# Patient Record
Sex: FEMALE | Race: WHITE | NOT HISPANIC OR LATINO | Employment: UNEMPLOYED | URBAN - METROPOLITAN AREA
[De-identification: names, ages, dates, MRNs, and addresses within clinical notes are randomized per-mention and may not be internally consistent; named-entity substitution may affect disease eponyms.]

---

## 2017-11-13 ENCOUNTER — HOSPITAL ENCOUNTER (EMERGENCY)
Facility: HOSPITAL | Age: 57
Discharge: HOME | End: 2017-11-13
Attending: EMERGENCY MEDICINE | Admitting: EMERGENCY MEDICINE
Payer: COMMERCIAL

## 2017-11-13 VITALS
DIASTOLIC BLOOD PRESSURE: 84 MMHG | BODY MASS INDEX: 35.34 KG/M2 | SYSTOLIC BLOOD PRESSURE: 129 MMHG | OXYGEN SATURATION: 98 % | RESPIRATION RATE: 20 BRPM | HEIGHT: 64 IN | WEIGHT: 207 LBS | TEMPERATURE: 98.2 F | HEART RATE: 86 BPM

## 2017-11-13 DIAGNOSIS — J30.9 ALLERGIC RHINITIS: Primary | ICD-10-CM

## 2017-11-13 PROCEDURE — 99282 EMERGENCY DEPT VISIT SF MDM: CPT

## 2017-11-13 RX ORDER — PREDNISONE 20 MG/1
60 TABLET ORAL DAILY
Qty: 15 TABLET | Refills: 0 | Status: SHIPPED | OUTPATIENT
Start: 2017-11-13 | End: 2017-11-18

## 2017-11-13 RX ORDER — LORATADINE 10 MG/1
10 TABLET ORAL DAILY
Qty: 20 TABLET | Refills: 0 | Status: SHIPPED | OUTPATIENT
Start: 2017-11-13

## 2017-11-13 RX ORDER — MOMETASONE FUROATE 50 UG/1
2 SPRAY, METERED NASAL DAILY
Qty: 17 G | Refills: 0 | Status: SHIPPED | OUTPATIENT
Start: 2017-11-13 | End: 2018-07-10

## 2017-11-13 RX ORDER — PREDNISONE 20 MG/1
60 TABLET ORAL ONCE
Status: COMPLETED | OUTPATIENT
Start: 2017-11-13 | End: 2017-11-13

## 2017-11-13 RX ADMIN — PREDNISONE 60 MG: 20 TABLET ORAL at 17:12

## 2017-11-13 NOTE — ED PROVIDER NOTES
History  Chief Complaint   Patient presents with    Nasal Congestion     61-year-old female patient presents to the emergency department initially stating something about blurry vision however she states this has been going on for over one and half years  The reason she came to the emergency department today was because of a burning sensation she is having in her ears nose mouth and throat  The patient states this is worse over last several days  Patient does have some nasal congestion, does have a remote history of environmental allergies, does state that her symptoms which he has had before were taking care of by Percocet  Currently, the patient is no distress, speaking in full and interrupted sentences without pausing to breathe, as no adventitious breath sounds  The patient is congested is her only real symptom  Because the patient has no other new symptoms other than the nasal congestion, does have swollen turbinates, does have nasal congestion, does have seasonal allergies under past, The patient will be treated for seasonal allergies  History provided by:  Patient   used: No    Eye Problem   Quality:  Aching  Severity:  Mild  Onset quality:  Gradual  Timing:  Constant  Progression:  Worsening  Chronicity:  Recurrent  Context: not chemical exposure, not using machinery and not UV exposure    Relieved by:  Nothing  Worsened by:  Nothing  Ineffective treatments:  None tried  Associated symptoms: no crusting, no discharge, no double vision, no itching, no nausea, no photophobia, no scotomas, no swelling and no tingling        Prior to Admission Medications   Prescriptions Last Dose Informant Patient Reported? Taking?    AMLODIPINE BESYLATE PO   Yes Yes   Sig: Take by mouth daily   escitalopram (LEXAPRO) 10 mg tablet   Yes Yes   Sig: Take 10 mg by mouth daily   metFORMIN (GLUCOPHAGE) 500 mg tablet   Yes No   Sig: Take 500 mg by mouth 2 (two) times a day with meals Facility-Administered Medications: None       Past Medical History:   Diagnosis Date    Cancer ([facility] )     Depression     Diabetes mellitus ([facility] )     Hypertension        Past Surgical History:   Procedure Laterality Date    HYSTERECTOMY      KIDNEY STONE SURGERY      SKIN GRAFT         History reviewed  No pertinent family history  I have reviewed and agree with the history as documented  Social History   Substance Use Topics    Smoking status: Current Every Day Smoker     Packs/day: 1 00    Smokeless tobacco: Never Used    Alcohol use No        Review of Systems   Eyes: Negative for double vision, photophobia, discharge and itching  Gastrointestinal: Negative for nausea  Neurological: Negative for tingling  All other systems reviewed and are negative  Physical Exam  ED Triage Vitals [11/13/17 1551]   Temperature Pulse Respirations Blood Pressure SpO2   98 2 °F (36 8 °C) 86 20 129/84 98 %      Temp Source Heart Rate Source Patient Position - Orthostatic VS BP Location FiO2 (%)   Oral Monitor Sitting Left arm --      Pain Score       8           Orthostatic Vital Signs  Vitals:    11/13/17 1551   BP: 129/84   Pulse: 86   Patient Position - Orthostatic VS: Sitting       Physical Exam   Constitutional: She is oriented to person, place, and time  She appears well-developed and well-nourished  HENT:   Head: Normocephalic and atraumatic  Right Ear: External ear normal    Left Ear: External ear normal    Eyes: Conjunctivae and EOM are normal    Neck: No JVD present  No tracheal deviation present  No thyromegaly present  Cardiovascular: Normal rate  Pulmonary/Chest: Effort normal and breath sounds normal  No stridor  Abdominal: Soft  She exhibits no distension and no mass  There is no tenderness  There is no guarding  No hernia  Musculoskeletal: Normal range of motion  She exhibits no edema, tenderness or deformity  Lymphadenopathy:     She has no cervical adenopathy     Neurological: She is alert and oriented to person, place, and time  Skin: Skin is warm  No rash noted  No erythema  No pallor  Psychiatric: She has a normal mood and affect  Her behavior is normal    Nursing note and vitals reviewed  ED Medications  Medications   predniSONE tablet 60 mg (60 mg Oral Given 11/13/17 1712)       Diagnostic Studies  Results Reviewed     None                 No orders to display              Procedures  Procedures       Phone Contacts  ED Phone Contact    ED Course  ED Course                                MDM  Number of Diagnoses or Management Options  Allergic rhinitis: new and requires workup     Amount and/or Complexity of Data Reviewed  Clinical lab tests: ordered and reviewed  Tests in the radiology section of CPT®: ordered and reviewed  Decide to obtain previous medical records or to obtain history from someone other than the patient: yes  Review and summarize past medical records: yes    Patient Progress  Patient progress: stable    CritCare Time    Disposition  Final diagnoses: Allergic rhinitis     Time reflects when diagnosis was documented in both MDM as applicable and the Disposition within this note     Time User Action Codes Description Comment    11/13/2017  5:03 PM Mackenzie Carrington [J30 9] Allergic rhinitis       ED Disposition     ED Disposition Condition Comment    Discharge  [facility] discharge to home/self care      Condition at discharge: Good        Follow-up Information     Follow up With Specialties Details Why [facility]  Follow up as directed on 11/16 [address] [facility][note 3] [phone]          Discharge Medication List as of 11/13/2017  5:05 PM      START taking these medications    Details   loratadine (CLARITIN) 10 mg tablet Take 1 tablet by mouth daily, Starting Mon 11/13/2017, Print      mometasone (NASONEX) 50 mcg/act nasal spray 2 sprays into each nostril daily, Starting Mon 11/13/2017, Print predniSONE 20 mg tablet Take 3 tablets by mouth daily for 5 days, Starting Mon 11/13/2017, Until Sat 11/18/2017, Print         CONTINUE these medications which have NOT CHANGED    Details   AMLODIPINE BESYLATE PO Take by mouth daily, Historical Med      escitalopram (LEXAPRO) 10 mg tablet Take 10 mg by mouth daily, Until Discontinued, Historical Med      metFORMIN (GLUCOPHAGE) 500 mg tablet Take 500 mg by mouth 2 (two) times a day with meals, Until Discontinued, Historical Med           No discharge procedures on file      ED Provider  Electronically Signed by           Alen Pfeiffer, DO  11/17/17 50 Annamaria Mccloud, DO  11/17/17 5473

## 2017-11-13 NOTE — DISCHARGE INSTRUCTIONS
Allergies, Ambulatory Care   GENERAL INFORMATION:   Allergies  are an immune system reaction to a substance called an allergen  Your immune system sees the allergen as harmful and attacks it  Common symptoms include the following:   · Sneezing and runny, itchy, or stuffy nose    · Swollen, watery, or itchy eyes    · Itchy skin, mouth, ears, or throat    · Swelling, pain, or itch at the site of an insect sting  Seek immediate care for the following symptoms:   · Trouble swallowing or your throat or tongue is swollen    · Wheezing or trouble breathing    · Dizziness or feeling faint    · Chest pain or your heart is fluttering  Treatment for allergies  may include medicines to slow a serious allergic reaction  You may be given medicines that help decrease itching, sneezing, and swelling or help your nose feel less stuffy  Your healthcare provider may give you several different medicines to help decrease swelling, redness, and itching  Medicines may be given as pills, shots, or put directly on your skin  Nasal sprays or eye drops may also be used  Desensitization treatment may get your body used to allergens you cannot avoid  Your healthcare provider will give you a shot that contains a small amount of an allergen, giving a little more each time until your body gets used to it  Your healthcare provider will watch you closely and treat any allergic reaction you have  Your reaction to the allergen may be less serious after this treatment  Ask your healthcare provider how long you need to get the shots  Manage allergies:   · Use nasal rinses  Healthcare providers may suggest that you rinse your nasal passages with a saline solution  Daily rinsing may help clear your nose of allergens  · Do not smoke  Your allergy symptoms may decrease if you are not around smoke  If you smoke, it is never too late to quit  Ask your healthcare provider for information about how to stop if you need help quitting      · Carry medical alert identification  You may want to wear medical alert jewelry or carry a card that says you have an allergy  Ask your healthcare provider where to get medical alert identification  Prevent allergic reactions:   · Avoid seasonal allergic reactions  Do not go outside when pollen counts are high  Your symptoms may be better if you go outside only in the morning or evening  Use your air conditioner and change air filters often  · Dust and vacuum your home often  to avoid allergic reactions to dust, fur, or mold  You may want to wear a mask when you vacuum  Keep pets in certain rooms and bathe them often  Use a dehumidifier (machine that decreases moisture) to help prevent mold  · Do not use products that contain latex  if you have a latex allergy  Use nonlatex gloves if you work in healthcare or in food preparation  Always tell healthcare providers if you have a latex allergy  · Avoid insect stings  Stay away from areas or activities that increase your risk for being stung  These include trash cans, gardening, and picnics  Do not wear bright clothing or strong scents when you will be outside  Follow up with your healthcare provider as directed:  Write down your questions so you remember to ask them during your visits  CARE AGREEMENT:   You have the right to help plan your care  Learn about your health condition and how it may be treated  Discuss treatment options with your caregivers to decide what care you want to receive  You always have the right to refuse treatment  The above information is an  only  It is not intended as medical advice for individual conditions or treatments  Talk to your doctor, nurse or pharmacist before following any medical regimen to see if it is safe and effective for you  © 2014 [address] is for End User's use only and may not be sold, redistributed or otherwise used for commercial purposes   All illustrations and images included in Tad are the copyrighted property of A D A M , Inc  or Peter Whittington

## 2018-02-17 ENCOUNTER — APPOINTMENT (EMERGENCY)
Dept: CT IMAGING | Facility: HOSPITAL | Age: 58
End: 2018-02-17
Payer: COMMERCIAL

## 2018-02-17 ENCOUNTER — HOSPITAL ENCOUNTER (EMERGENCY)
Facility: HOSPITAL | Age: 58
Discharge: HOME | End: 2018-02-18
Attending: EMERGENCY MEDICINE
Payer: COMMERCIAL

## 2018-02-17 DIAGNOSIS — R42 INTERMITTENT LIGHTHEADEDNESS: ICD-10-CM

## 2018-02-17 DIAGNOSIS — F41.9 ANXIETY: ICD-10-CM

## 2018-02-17 DIAGNOSIS — R00.2 PALPITATIONS: Primary | ICD-10-CM

## 2018-02-17 LAB
ALBUMIN SERPL BCP-MCNC: 3.7 G/DL (ref 3.5–5)
ALP SERPL-CCNC: 103 U/L (ref 46–116)
ALT SERPL W P-5'-P-CCNC: 45 U/L (ref 12–78)
ANION GAP SERPL CALCULATED.3IONS-SCNC: 9 MMOL/L (ref 4–13)
APTT PPP: 32 SECONDS (ref 23–35)
AST SERPL W P-5'-P-CCNC: 23 U/L (ref 5–45)
BASOPHILS # BLD AUTO: 0.05 THOUSANDS/ΜL (ref 0–0.1)
BASOPHILS NFR BLD AUTO: 1 % (ref 0–1)
BILIRUB SERPL-MCNC: 0.4 MG/DL (ref 0.2–1)
BUN SERPL-MCNC: 16 MG/DL (ref 5–25)
CALCIUM SERPL-MCNC: 9.3 MG/DL (ref 8.3–10.1)
CHLORIDE SERPL-SCNC: 102 MMOL/L (ref 100–108)
CO2 SERPL-SCNC: 27 MMOL/L (ref 21–32)
CREAT SERPL-MCNC: 0.85 MG/DL (ref 0.6–1.3)
EOSINOPHIL # BLD AUTO: 0.21 THOUSAND/ΜL (ref 0–0.61)
EOSINOPHIL NFR BLD AUTO: 2 % (ref 0–6)
ERYTHROCYTE [DISTWIDTH] IN BLOOD BY AUTOMATED COUNT: 12.8 % (ref 11.6–15.1)
GFR SERPL CREATININE-BSD FRML MDRD: 77 ML/MIN/1.73SQ M
GLUCOSE SERPL-MCNC: 177 MG/DL (ref 65–140)
HCT VFR BLD AUTO: 43.5 % (ref 34.8–46.1)
HGB BLD-MCNC: 14.9 G/DL (ref 11.5–15.4)
INR PPP: 1.05 (ref 0.86–1.16)
LYMPHOCYTES # BLD AUTO: 3.05 THOUSANDS/ΜL (ref 0.6–4.47)
LYMPHOCYTES NFR BLD AUTO: 31 % (ref 14–44)
MAGNESIUM SERPL-MCNC: 2 MG/DL (ref 1.6–2.6)
MCH RBC QN AUTO: 28.7 PG (ref 26.8–34.3)
MCHC RBC AUTO-ENTMCNC: 34.3 G/DL (ref 31.4–37.4)
MCV RBC AUTO: 84 FL (ref 82–98)
MONOCYTES # BLD AUTO: 0.97 THOUSAND/ΜL (ref 0.17–1.22)
MONOCYTES NFR BLD AUTO: 10 % (ref 4–12)
NEUTROPHILS # BLD AUTO: 5.57 THOUSANDS/ΜL (ref 1.85–7.62)
NEUTS SEG NFR BLD AUTO: 56 % (ref 43–75)
NRBC BLD AUTO-RTO: 0 /100 WBCS
PLATELET # BLD AUTO: 210 THOUSANDS/UL (ref 149–390)
PMV BLD AUTO: 12 FL (ref 8.9–12.7)
POTASSIUM SERPL-SCNC: 3.7 MMOL/L (ref 3.5–5.3)
PROT SERPL-MCNC: 7.5 G/DL (ref 6.4–8.2)
PROTHROMBIN TIME: 13.9 SECONDS (ref 12.1–14.4)
RBC # BLD AUTO: 5.2 MILLION/UL (ref 3.81–5.12)
SODIUM SERPL-SCNC: 138 MMOL/L (ref 136–145)
TROPONIN I SERPL-MCNC: <0.02 NG/ML
TSH SERPL DL<=0.05 MIU/L-ACNC: 2.24 UIU/ML (ref 0.36–3.74)
WBC # BLD AUTO: 9.88 THOUSAND/UL (ref 4.31–10.16)

## 2018-02-17 PROCEDURE — 84443 ASSAY THYROID STIM HORMONE: CPT | Performed by: EMERGENCY MEDICINE

## 2018-02-17 PROCEDURE — 71275 CT ANGIOGRAPHY CHEST: CPT

## 2018-02-17 PROCEDURE — 85730 THROMBOPLASTIN TIME PARTIAL: CPT | Performed by: EMERGENCY MEDICINE

## 2018-02-17 PROCEDURE — 85025 COMPLETE CBC W/AUTO DIFF WBC: CPT | Performed by: EMERGENCY MEDICINE

## 2018-02-17 PROCEDURE — 84484 ASSAY OF TROPONIN QUANT: CPT | Performed by: EMERGENCY MEDICINE

## 2018-02-17 PROCEDURE — 93005 ELECTROCARDIOGRAM TRACING: CPT

## 2018-02-17 PROCEDURE — 80053 COMPREHEN METABOLIC PANEL: CPT | Performed by: EMERGENCY MEDICINE

## 2018-02-17 PROCEDURE — 85610 PROTHROMBIN TIME: CPT | Performed by: EMERGENCY MEDICINE

## 2018-02-17 PROCEDURE — 36415 COLL VENOUS BLD VENIPUNCTURE: CPT | Performed by: EMERGENCY MEDICINE

## 2018-02-17 PROCEDURE — 96360 HYDRATION IV INFUSION INIT: CPT

## 2018-02-17 PROCEDURE — 83735 ASSAY OF MAGNESIUM: CPT | Performed by: EMERGENCY MEDICINE

## 2018-02-17 RX ORDER — LORAZEPAM 0.5 MG/1
0.5 TABLET ORAL ONCE
Status: COMPLETED | OUTPATIENT
Start: 2018-02-17 | End: 2018-02-17

## 2018-02-17 RX ADMIN — LORAZEPAM 0.5 MG: 0.5 TABLET ORAL at 21:57

## 2018-02-17 RX ADMIN — SODIUM CHLORIDE 1000 ML: 0.9 INJECTION, SOLUTION INTRAVENOUS at 22:00

## 2018-02-17 RX ADMIN — IOHEXOL 85 ML: 350 INJECTION, SOLUTION INTRAVENOUS at 22:34

## 2018-02-18 VITALS
BODY MASS INDEX: 34.85 KG/M2 | RESPIRATION RATE: 16 BRPM | HEART RATE: 84 BPM | WEIGHT: 203.04 LBS | TEMPERATURE: 97.7 F | SYSTOLIC BLOOD PRESSURE: 139 MMHG | DIASTOLIC BLOOD PRESSURE: 63 MMHG | OXYGEN SATURATION: 97 %

## 2018-02-18 LAB
ATRIAL RATE: 80 BPM
ATRIAL RATE: 84 BPM
P AXIS: 66 DEGREES
P AXIS: 68 DEGREES
PR INTERVAL: 150 MS
PR INTERVAL: 154 MS
QRS AXIS: 25 DEGREES
QRS AXIS: 26 DEGREES
QRSD INTERVAL: 84 MS
QRSD INTERVAL: 88 MS
QT INTERVAL: 368 MS
QT INTERVAL: 408 MS
QTC INTERVAL: 434 MS
QTC INTERVAL: 470 MS
T WAVE AXIS: 141 DEGREES
T WAVE AXIS: 152 DEGREES
TROPONIN I SERPL-MCNC: <0.02 NG/ML
VENTRICULAR RATE: 80 BPM
VENTRICULAR RATE: 84 BPM

## 2018-02-18 PROCEDURE — 84484 ASSAY OF TROPONIN QUANT: CPT | Performed by: EMERGENCY MEDICINE

## 2018-02-18 PROCEDURE — 93010 ELECTROCARDIOGRAM REPORT: CPT | Performed by: INTERNAL MEDICINE

## 2018-02-18 PROCEDURE — 36415 COLL VENOUS BLD VENIPUNCTURE: CPT | Performed by: EMERGENCY MEDICINE

## 2018-02-18 PROCEDURE — 93005 ELECTROCARDIOGRAM TRACING: CPT

## 2018-02-18 PROCEDURE — 99285 EMERGENCY DEPT VISIT HI MDM: CPT

## 2018-02-18 RX ORDER — HYDROXYZINE HYDROCHLORIDE 25 MG/1
25 TABLET, FILM COATED ORAL EVERY 6 HOURS PRN
Qty: 12 TABLET | Refills: 0 | Status: SHIPPED | OUTPATIENT
Start: 2018-02-18 | End: 2018-07-10

## 2018-02-18 NOTE — DISCHARGE INSTRUCTIONS
Heart Palpitations   WHAT YOU NEED TO KNOW:   Heart palpitations are feelings that your heart races, jumps, throbs, or flutters  You may feel extra beats, no beats for a short time, or skipped beats  You may have these feelings in your chest, throat, or neck  They may happen when you are sitting, standing, or lying  Heart palpitations may be frightening, but are usually not caused by a serious problem  DISCHARGE INSTRUCTIONS:   Call 911 or have someone else call for any of the following:   · You have any of the following signs of a heart attack:      ¨ Squeezing, pressure, or pain in your chest that lasts longer than 5 minutes or returns    ¨ Discomfort or pain in your back, neck, jaw, stomach, or arm     ¨ Trouble breathing    ¨ Nausea or vomiting    ¨ Lightheadedness or a sudden cold sweat, especially with chest pain or trouble breathing    · You have any of the following signs of a stroke:      ¨ Numbness or drooping on one side of your face     ¨ Weakness in an arm or leg    ¨ Confusion or difficulty speaking    ¨ Dizziness, a severe headache, or vision loss    · You faint or lose consciousness  Seek care immediately if:   · Your palpitations happen more often or last longer than usual      · You have palpitations and shortness of breath, nausea, sweating, or dizziness  Contact your healthcare provider if:   · You have questions or concerns about your condition or care  Follow up with your healthcare provider as directed: You may need to follow up with a cardiologist  Naveed Crain may need tests to check for heart problems that cause palpitations  Write down your questions so you remember to ask them during your visits  Keep a record:  Write down when your palpitations start and stop, what you were doing when they started, and your symptoms  Keep track of what you ate or drank within a few hours of your palpitations   Include anything that seemed to help your symptoms, such as lying down or holding your breath  This record will help you and your healthcare provider learn what triggers your palpitations  Bring this record with you to your follow up visits  Help prevent heart palpitations:   · Manage stress and anxiety  Find ways to relax such as listening to music, meditating, or doing yoga  Exercise can also help decrease stress and anxiety  Talk to someone you trust about your stress or anxiety  You can also talk to a therapist      · Get plenty of sleep every night  Ask your healthcare provider how much sleep you need each night  · Do not drink caffeine or alcohol  Caffeine and alcohol can make your palpitations worse  Caffeine is found in soda, coffee, tea, chocolate, and drinks that increase your energy  · Do not smoke  Nicotine and other chemicals in cigarettes and cigars may damage your heart and blood vessels  Ask your healthcare provider for information if you currently smoke and need help to quit  E-cigarettes or smokeless tobacco still contain nicotine  Talk to your healthcare provider before you use these products  · Do not use illegal drugs  Talk to your healthcare provider if you use illegal drugs and want help to quit  © 2017 [address] [facility] Information is for End User's use only and may not be sold, redistributed or otherwise used for commercial purposes  All illustrations and images included in CareNotes® are the copyrighted property of A D A M , Inc  or Peter Whittington  The above information is an  only  It is not intended as medical advice for individual conditions or treatments  Talk to your doctor, nurse or pharmacist before following any medical regimen to see if it is safe and effective for you  Lightheadedness   WHAT YOU NEED TO KNOW:   Lightheadedness is the feeling that you may faint, but you do not  Your heartbeat may be fast or feel like it flutters   Lightheadedness may occur when you take certain medicines, such as medicine to lower your blood pressure  Dehydration, low sodium, low blood sugar, an abnormal heart rhythm, and anxiety are other common causes  DISCHARGE INSTRUCTIONS:   Return to the emergency department if:   · You have sudden chest pain  · You have trouble breathing or shortness of breath  · You have vision changes, are sweating, and have nausea while you are sitting or lying down  · You feel flushed and your heart is fluttering  · You faint  Contact your healthcare provider if:   · You feel lightheaded often  · Your heart beats faster or slower than usual      · You have questions or concerns about your condition or care  Follow up with your healthcare provider as directed: You may need more tests to help find the cause of your lightheadedness  The tests will help healthcare providers plan the best treatment for you  Write down your questions so you remember to ask them during your visits  Self-care:  Talk with your healthcare provider about these and other ways to manage your symptoms:  · Lie down  when you feel lightheaded, your throat gets tight, or your vision changes  Raise your legs above the level of your heart  · Stand up slowly  Sit on the side of the bed or couch for a few minutes before you stand up  · Take slow, deep breaths when you feel lightheaded  This can help decrease the feeling that you might faint  · Ask if you need to avoid hot baths and saunas  These may make your symptoms worse  Watch for signs of low blood sugar: These include hunger, nervousness, sweating, and fast or fluttery heartbeats  Talk with your healthcare provider about ways to keep your blood sugar level steady  Check your blood pressure often:  You should do this especially if you take medicine to lower your blood pressure  Check your blood pressure when you are lying down and when you are standing  Ask how often to check during the day  Keep a record of your blood pressure numbers   Your healthcare provider may use the record to help plan your treatment  Keep a record of your lightheadedness episodes:  Include your symptoms and your activity before and after the episode  The record can help your healthcare provider find the cause of your lightheadedness and help you manage episodes  © 2017 [address] Information is for End User's use only and may not be sold, redistributed or otherwise used for commercial purposes  All illustrations and images included in CareNotes® are the copyrighted property of A D A M , Inc  or Peter Whittington  The above information is an  only  It is not intended as medical advice for individual conditions or treatments  Talk to your doctor, nurse or pharmacist before following any medical regimen to see if it is safe and effective for you  Anxiety   WHAT YOU SHOULD KNOW:   Anxiety is a condition that causes you to feel excessive worry, uneasiness, or fear  Family or work stress, smoking, caffeine, and alcohol can increase your risk for anxiety  Certain medicines or health conditions can also increase your risk  Anxiety may begin gradually, and can become a long-term condition if it is not managed or treated  AFTER YOU LEAVE:   Medicines:   · Medicines  can help you feel more calm and relaxed, and decrease your symptoms  · Take your medicine as directed  Contact your healthcare provider if you think your medicine is not helping or if you have side effects  Tell him if you are allergic to any medicine  Keep a list of the medicines, vitamins, and herbs you take  Include the amounts, and when and why you take them  Bring the list or the pill bottles to follow-up visits  Carry your medicine list with you in case of an emergency  Follow up with your healthcare provider within 2 weeks or as directed:  Write down your questions so you remember to ask them during your visits  Manage anxiety:   · Go to counseling as directed  Cognitive behavioral therapy can help you understand and change how you react to events that trigger your symptoms  · Find ways to manage your symptoms  Activities such as exercise, meditation, or listening to music can help you relax  · Practice deep breathing  Breathing can change how your body reacts to stress  Focus on taking slow, deep breaths several times a day, or during an anxiety attack  Breathe in through your nose, and out through your mouth  · Avoid caffeine  Caffeine can make your symptoms worse  Avoid foods or drinks that are meant to increase your energy level  · Limit or avoid alcohol  Ask your healthcare provider if alcohol is safe for you  You may not be able to drink alcohol if you take certain anxiety or depression medicines  Limit alcohol to 1 drink per day if you are a woman  Limit alcohol to 2 drinks per day if you are a man  A drink of alcohol is 12 ounces of beer, 5 ounces of wine, or 1½ ounces of liquor  Contact your healthcare provider if:   · Your symptoms get worse or do not get better with treatment  · You think your medicine may be causing side effects  · Your anxiety keeps you from doing your regular daily activities  · You have new symptoms since your last visit  · You have questions or concerns about your condition or care  Seek care immediately or call 911 if:   · You have chest pain, tightness, or heaviness that may spread to your shoulders, arms, jaw, neck, or back  · You feel like hurting yourself or someone else  · You feel dizzy, lightheaded, or faint  © 2014 [address] is for End User's use only and may not be sold, redistributed or otherwise used for commercial purposes  All illustrations and images included in CareNotes® are the copyrighted property of [employer]  or Peter Whittington  The above information is an  only   It is not intended as medical advice for individual conditions or treatments  Talk to your doctor, nurse or pharmacist before following any medical regimen to see if it is safe and effective for you

## 2018-02-18 NOTE — ED PROVIDER NOTES
History  Chief Complaint   Patient presents with    Palpitations     Pt reports increased tremors, "all over" numbness, palpitations, dizziness, SOB, nausea  Patient is a 53-year-old female with past medical history of type 2 diabetes, hypertension, cancer status post surgical resection 1 year ago (unknown cancer type but tumor on posterior neck), presents to the emergency department complaining of palpitations that she describes as a fluttering in her chest, dizziness which she describes as lightheadedness, whole body numbness sensation and left shoulder pain  Patient states her symptoms started about 2 days ago and were initially episodic however today they have been more constant  She states it started with whole body numbness including her bilateral arms and bilateral legs  She also is having intermittent left shoulder discomfort  She feels a fluttering in her chest which symptoms takes her breath away, as well as nausea and intermittent lightheadedness which is worse when she sits up or stands up  She also reports having tremors in both of her hands however this is not new and she has been dealing with this for years  She states the tremors gets so bad that she cannot grasp things  On review of systems, she reports generalized abdominal pain however this is chronic for her  She also reports having chronic diarrhea  She denies any new fever, chills, headache, vertigo, change in vision or hearing, tinnitus, photophobia, URI symptoms or cough, hemoptysis, chest pain, shortness of breath, abdominal distension, vomiting, bright red blood per rectum, melena, dysuria, frequency, hematuria, skin rash or color change, extremity swelling or pain, extremity weakness, lateralizing paresthesias, slurring of speech, facial asymmetry or other focal neurologic deficits  She denies history of anxiety however she does states she has been under increased stress recently    She states she quit her job due to the confrontation with her boss back in December  She currently has no income which is causing her lot of stress  She denies any recent travel, prolonged immobilization or recent surgeries/hospitalization  She is supposed to follow up with her cancer doctor in 1 month for her 1st checkup since the surgical tumor resection  History provided by:  Patient and spouse   used: No    Palpitations   Associated symptoms: nausea and numbness    Associated symptoms: no back pain, no chest pain, no cough, no diaphoresis, no dizziness, no shortness of breath, no vomiting and no weakness        Prior to Admission Medications   Prescriptions Last Dose Informant Patient Reported? Taking? AMLODIPINE BESYLATE PO   Yes Yes   Sig: Take by mouth daily   escitalopram (LEXAPRO) 10 mg tablet 2018 at Unknown time  Yes Yes   Sig: Take 10 mg by mouth daily   loratadine (CLARITIN) 10 mg tablet 2018 at Unknown time  No Yes   Sig: Take 1 tablet by mouth daily   metFORMIN (GLUCOPHAGE) 500 mg tablet 2018 at Unknown time  Yes Yes   Sig: Take 500 mg by mouth 2 (two) times a day with meals   mometasone (NASONEX) 50 mcg/act nasal spray Past Week at Unknown time  No Yes   Si sprays into each nostril daily      Facility-Administered Medications: None       Past Medical History:   Diagnosis Date    Cancer ([facility] )     Depression     Diabetes mellitus ([facility] )     Hypertension        Past Surgical History:   Procedure Laterality Date    HYSTERECTOMY      KIDNEY STONE SURGERY      SKIN GRAFT         History reviewed  No pertinent family history  I have reviewed and agree with the history as documented  Social History   Substance Use Topics    Smoking status: Current Every Day Smoker     Packs/day: 1 00    Smokeless tobacco: Never Used    Alcohol use No        Review of Systems   Constitutional: Negative for appetite change, chills, diaphoresis, fatigue and fever  HENT: Positive for congestion  Negative for ear pain, rhinorrhea and sore throat  Eyes: Negative for photophobia, pain and visual disturbance  Respiratory: Negative for cough, chest tightness, shortness of breath and wheezing  Cardiovascular: Positive for palpitations  Negative for chest pain and leg swelling  Gastrointestinal: Positive for diarrhea and nausea  Negative for abdominal distention, abdominal pain, blood in stool, constipation and vomiting  Genitourinary: Negative for dysuria, flank pain, frequency and hematuria  Musculoskeletal: Negative for back pain, neck pain and neck stiffness  Skin: Negative for color change, pallor and rash  Allergic/Immunologic: Negative for immunocompromised state  Neurological: Positive for tremors, light-headedness and numbness  Negative for dizziness, seizures, syncope, facial asymmetry, speech difficulty, weakness and headaches  Hematological: Negative for adenopathy  Psychiatric/Behavioral: Negative for confusion and decreased concentration         + Stress/anxiety  All other systems reviewed and are negative  Physical Exam  ED Triage Vitals   Temperature Pulse Respirations Blood Pressure SpO2   02/17/18 2219 02/17/18 2127 02/17/18 2127 02/17/18 2127 02/17/18 2127   97 7 °F (36 5 °C) 86 19 (!) 178/79 98 %      Temp Source Heart Rate Source Patient Position - Orthostatic VS BP Location FiO2 (%)   02/17/18 2219 02/17/18 2127 02/17/18 2127 02/17/18 2127 --   Oral Monitor Lying Right arm       Pain Score       02/17/18 2127       No Pain         Vitals:    02/17/18 2127 02/17/18 2219 02/17/18 2330 02/18/18 0000   BP: (!) 178/79  140/65 134/63   BP Location: Right arm      Pulse: 86  82 86   Resp: 19  16 (!) 24   Temp:  97 7 °F (36 5 °C)     TempSrc:  Oral     SpO2: 98%  98% 99%   Weight: 92 1 kg (203 lb 0 7 oz)        Physical Exam   Constitutional: She is oriented to person, place, and time  She appears well-developed and well-nourished  No distress     HENT:   Head: Normocephalic and atraumatic  Mouth/Throat: Oropharynx is clear and moist  No oropharyngeal exudate  Eyes: Conjunctivae and EOM are normal  Pupils are equal, round, and reactive to light  Neck: Normal range of motion  Neck supple  No JVD present  No thyromegaly present  Cardiovascular: Normal rate, regular rhythm, normal heart sounds and intact distal pulses  Exam reveals no gallop and no friction rub  No murmur heard  Pulmonary/Chest: Effort normal and breath sounds normal  No respiratory distress  She has no wheezes  She has no rales  She exhibits no tenderness  Abdominal: Soft  Bowel sounds are normal  She exhibits no distension  There is no tenderness  There is no rebound and no guarding  Musculoskeletal: Normal range of motion  She exhibits no edema or tenderness  No calf tenderness or unilateral calf swelling  Lymphadenopathy:     She has no cervical adenopathy  Neurological: She is alert and oriented to person, place, and time  No cranial nerve deficit  Patient has mild intention tremor of bilateral hands  No gross motor or sensory deficits  5/5 strength throughout  Skin: Skin is warm and dry  No rash noted  She is not diaphoretic  No erythema  No pallor  Psychiatric: Her behavior is normal    Patient appears mildly anxious  Nursing note and vitals reviewed        ED Medications  Medications   sodium chloride 0 9 % bolus 1,000 mL (0 mL Intravenous Stopped 2/17/18 2258)   LORazepam (ATIVAN) tablet 0 5 mg (0 5 mg Oral Given 2/17/18 2157)   iohexol (OMNIPAQUE) 350 MG/ML injection (MULTI-DOSE) 85 mL (85 mL Intravenous Given 2/17/18 2234)       Diagnostic Studies  Results Reviewed     Procedure Component Value Units Date/Time    Troponin I [[id number]]  (Normal) Collected:  02/18/18 0018    Lab Status:  Final result Specimen:  Blood from Arm, Right Updated:  02/18/18 0045     Troponin I <0 02 ng/mL     Narrative:         Siemens Chemistry analyzer 99% cutoff is > 0 04 ng/mL in network labs    o cTnI 99% cutoff is useful only when applied to patients in the clinical setting of myocardial ischemia  o cTnI 99% cutoff should be interpreted in the context of clinical history, ECG findings and possibly cardiac imaging to establish correct diagnosis  o cTnI 99% cutoff may be suggestive but clearly not indicative of a coronary event without the clinical setting of myocardial ischemia  Magnesium [[id number]]  (Normal) Collected:  02/17/18 2144    Lab Status:  Final result Specimen:  Blood from Arm, Right Updated:  02/17/18 2216     Magnesium 2 0 mg/dL     TSH, 3rd generation with T4 reflex [[id number]]  (Normal) Collected:  02/17/18 2144    Lab Status:  Final result Specimen:  Blood from Arm, Right Updated:  02/17/18 2216     TSH 3RD GENERATON 2 238 uIU/mL     Narrative:         Patients undergoing fluorescein dye angiography may retain small amounts of fluorescein in the body for 48-72 hours post procedure  Samples containing fluorescein can produce falsely depressed TSH values  If the patient had this procedure,a specimen should be resubmitted post fluorescein clearance  The recommended reference ranges for TSH during pregnancy are as follows:  First trimester 0 1 to 2 5 uIU/mL  Second trimester  0 2 to 3 0 uIU/mL  Third trimester 0 3 to 3 0 uIU/m      Troponin I [[id number]]  (Normal) Collected:  02/17/18 2144    Lab Status:  Final result Specimen:  Blood from Arm, Right Updated:  02/17/18 2212     Troponin I <0 02 ng/mL     Narrative:         S[employer]ens Chemistry analyzer 99% cutoff is > 0 04 ng/mL in network labs    o cTnI 99% cutoff is useful only when applied to patients in the clinical setting of myocardial ischemia  o cTnI 99% cutoff should be interpreted in the context of clinical history, ECG findings and possibly cardiac imaging to establish correct diagnosis    o cTnI 99% cutoff may be suggestive but clearly not indicative of a coronary event without the clinical setting of myocardial ischemia  Comprehensive metabolic panel [[id number]]  (Abnormal) Collected:  02/17/18 2144    Lab Status:  Final result Specimen:  Blood from Arm, Right Updated:  02/17/18 2207     Sodium 138 mmol/L      Potassium 3 7 mmol/L      Chloride 102 mmol/L      CO2 27 mmol/L      Anion Gap 9 mmol/L      BUN 16 mg/dL      Creatinine 0 85 mg/dL      Glucose 177 (H) mg/dL      Calcium 9 3 mg/dL      AST 23 U/L      ALT 45 U/L      Alkaline Phosphatase 103 U/L      Total Protein 7 5 g/dL      Albumin 3 7 g/dL      Total Bilirubin 0 40 mg/dL      eGFR 77 ml/min/1 73sq m     Narrative:         National Kidney Disease Education Program recommendations are as follows:  GFR calculation is accurate only with a steady state creatinine  Chronic Kidney disease less than 60 ml/min/1 73 sq  meters  Kidney failure less than 15 ml/min/1 73 sq  meters  Darya Gomez [[id number]]  (Normal) Collected:  02/17/18 2144    Lab Status:  Final result Specimen:  Blood from Arm, Right Updated:  02/17/18 2206     Protime 13 9 seconds      INR 1 05    APTT [[id number]]  (Normal) Collected:  02/17/18 2144    Lab Status:  Final result Specimen:  Blood from Arm, Right Updated:  02/17/18 2206     PTT 32 seconds     Narrative:          Therapeutic Heparin Range = 60-90 seconds    CBC and differential [[id number]]  (Abnormal) Collected:  02/17/18 2144    Lab Status:  Final result Specimen:  Blood from Arm, Right Updated:  02/17/18 2151     WBC 9 88 Thousand/uL      RBC 5 20 (H) Million/uL      Hemoglobin 14 9 g/dL      Hematocrit 43 5 %      MCV 84 fL      MCH 28 7 pg      MCHC 34 3 g/dL      RDW 12 8 %      MPV 12 0 fL      Platelets 105 Thousands/uL      nRBC 0 /100 WBCs      Neutrophils Relative 56 %      Lymphocytes Relative 31 %      Monocytes Relative 10 %      Eosinophils Relative 2 %      Basophils Relative 1 %      Neutrophils Absolute 5 57 Thousands/µL      Lymphocytes Absolute 3 05 Thousands/µL      Monocytes Absolute 0 97 Thousand/µL      Eosinophils Absolute 0 21 Thousand/µL      Basophils Absolute 0 05 Thousands/µL     UA w Reflex to Microscopic [[id number]]     Lab Status:  No result Specimen:  Urine                  CTA ED chest PE study   ED Interpretation by Leopoldo Razor, DO (02/17 8387)   [facility] report: "FINDINGS:   PULMONARY ARTERIES: Contrast opacification of the pulmonary arteries is adequate, and there   are no filling defects seen to suggest pulmonary embolism  AORTA: No evidence of aortic dissection  LUNGS: No evidence of significant focal consolidation/infiltrate in the lungs  No evidence of diffuse   pulmonary vascular congestion  PLEURAL SPACE: No pneumothorax or pleural effusions seen  HEART: No evidence of significant pericardial effusion  BONES/JOINTS: No acute bony abnormality identified  SOFT TISSUES: No acute abnormality of the visualized soft tissues seen  LYMPH NODES: No evidence of diffuse lymphadenopathy  LIVER: Fatty infiltration of the liver  GALLBLADDER AND BILE DUCTS: Mild biliary ductal dilatation, most likely related to the post   cholecystectomy state  In Recommend correlation with LFTs as clinically indicated  IMPRESSION:   - No evidence of pulmonary embolism or other significant acute abnormality in the chest    - See above for remaining findings  "                 Procedures  ECG 12 Lead Documentation  Date/Time: 2/17/2018 9:49 PM  Performed by: Marina Michelle by: Azul Soria     ECG reviewed by me, the ED Provider: yes    Patient location:  ED  Previous ECG:     Previous ECG:  Compared to current    Comparison ECG info:  10- - please note that the marked ST abnormality in the lateral leads was present on prior EKG      Similarity:  No change  Rate:     ECG rate:  84    ECG rate assessment: normal    Rhythm:     Rhythm: sinus rhythm    Ectopy:     Ectopy: none    QRS:     QRS axis:  Normal    QRS intervals:  Normal  Conduction:     Conduction: normal    ST segments:     ST segments: Abnormal    Depression:  I, aVL, V5, V6 and V4  T waves:     T waves comment:  T-wave inversion in lateral leads  Nonspecific T-wave abnormality in anterior leads  ECG 12 Lead Documentation  Date/Time: 2/18/2018 12:29 AM  Performed by: Crystal Lan by: Vasquez Alvarez     ECG reviewed by me, the ED Provider: yes    Patient location:  ED  Previous ECG:     Previous ECG:  Compared to current    Comparison ECG info:  No significant change  Rate:     ECG rate:  80    ECG rate assessment: normal    Rhythm:     Rhythm: sinus rhythm    Ectopy:     Ectopy: none    QRS:     QRS axis:  Normal    QRS intervals:  Normal  Conduction:     Conduction: normal    ST segments:     ST segments: ST depressions in lateral leads  T waves:     T waves comment:  T-wave inversion in lateral leads  Nonspecific T-wave abnormality in anterior leads  Phone Contacts  ED Phone Contact    ED Course  ED Course as of Feb 18 0111   Sat Feb 17, 2018   2247 Patient reassessed and updated of normal blood work thus far  She is agreeable to staying for delta troponin  Still awaiting CTA results  Patient states she overall is feeling better and more relaxed  Palpitations have improved  Sun Feb 18, 2018   5649 Repeat EKG and troponin unchanged  I feel patient is stable for discharge at this time  Discussed ED return parameters and advised close PCP follow-up  MDM  Number of Diagnoses or Management Options  Diagnosis management comments: 72-year-old female with past medical history of hypertension and diabetes presents with multiple complaints including palpitations, whole body numbness, worsening of chronic tremors, intermittent lightheadedness and nausea    Patient's symptoms most likely secondary to stress or anxiety however given her age and cardiac risk factors, this could be due to dysrhythmia, acute coronary syndrome, pulmonary embolism, electrolyte or metabolic abnormality, anemia  Will check cardiac labs, CBC, electrolytes  Will obtain a CTA of the chest to rule out pulmonary embolism  Will give IV fluid bolus and 0 5 milligrams of oral Ativan  Will keep for 3 hour delta troponin and if workup unremarkable, most likely this is anxiety and will send home to follow up with her family doctor  Amount and/or Complexity of Data Reviewed  Clinical lab tests: ordered and reviewed  Tests in the radiology section of CPT®: ordered and reviewed  Tests in the medicine section of CPT®: ordered and reviewed  Obtain history from someone other than the patient: yes  Independent visualization of images, tracings, or specimens: yes      CritCare Time    Disposition  Final diagnoses:   Palpitations   Intermittent lightheadedness   Anxiety     Time reflects when diagnosis was documented in both MDM as applicable and the Disposition within this note     Time User Action Codes Description Comment    2/18/2018  1:00 AM Gearline Ogles E Add [R00 2] Palpitations     2/18/2018  1:01 AM Gearline Ogles E Add [R42] Intermittent lightheadedness     2/18/2018  1:09 AM Gearline Ogles E Add [F41 9] Anxiety     2/18/2018  1:09 AM Gearline Ogles E Add [R20 2] Paresthesias     2/18/2018  1:10 AM Gearline Ogles E Remove [R20 2] Paresthesias       ED Disposition     ED Disposition Condition Comment    Discharge  [facility] discharge to home/self care      Condition at discharge: Stable        Follow-up Information     Follow up With Specialties Details Why Contact Info Additional [phone] Schedule an appointment as soon as possible for a visit  [address] Emergency Department Emergency Medicine Go to If symptoms worsen [facility] [address], [address], [place], [address]        Patient's Medications   Discharge Prescriptions    HYDROXYZINE HCL (ATARAX) 25 MG TABLET    Take 1 tablet (25 mg total) by mouth every 6 (six) hours as needed for anxiety       Start Date: 2/18/2018 End Date: --       Order Dose: 25 mg       Quantity: 12 tablet    Refills: 0     No discharge procedures on file      ED Provider  Electronically Signed by           Lashawn Mcgrath DO  02/18/18 0118

## 2018-04-30 ENCOUNTER — APPOINTMENT (EMERGENCY)
Dept: RADIOLOGY | Facility: HOSPITAL | Age: 58
End: 2018-04-30
Payer: COMMERCIAL

## 2018-04-30 ENCOUNTER — APPOINTMENT (EMERGENCY)
Dept: CT IMAGING | Facility: HOSPITAL | Age: 58
End: 2018-04-30
Payer: COMMERCIAL

## 2018-04-30 ENCOUNTER — HOSPITAL ENCOUNTER (EMERGENCY)
Facility: HOSPITAL | Age: 58
Discharge: HOME | End: 2018-05-01
Attending: EMERGENCY MEDICINE | Admitting: EMERGENCY MEDICINE
Payer: COMMERCIAL

## 2018-04-30 VITALS
OXYGEN SATURATION: 96 % | BODY MASS INDEX: 34.33 KG/M2 | RESPIRATION RATE: 18 BRPM | HEART RATE: 74 BPM | WEIGHT: 200 LBS | SYSTOLIC BLOOD PRESSURE: 150 MMHG | TEMPERATURE: 98.1 F | DIASTOLIC BLOOD PRESSURE: 68 MMHG

## 2018-04-30 DIAGNOSIS — S60.222A CONTUSION OF LEFT HAND, INITIAL ENCOUNTER: ICD-10-CM

## 2018-04-30 DIAGNOSIS — S62.113A: Primary | ICD-10-CM

## 2018-04-30 PROCEDURE — 73200 CT UPPER EXTREMITY W/O DYE: CPT

## 2018-04-30 PROCEDURE — 73130 X-RAY EXAM OF HAND: CPT

## 2018-04-30 RX ORDER — OXYCODONE HYDROCHLORIDE AND ACETAMINOPHEN 5; 325 MG/1; MG/1
1 TABLET ORAL ONCE
Status: COMPLETED | OUTPATIENT
Start: 2018-04-30 | End: 2018-04-30

## 2018-04-30 RX ORDER — HYDROCODONE BITARTRATE AND ACETAMINOPHEN 5; 325 MG/1; MG/1
1 TABLET ORAL ONCE
Status: COMPLETED | OUTPATIENT
Start: 2018-04-30 | End: 2018-04-30

## 2018-04-30 RX ORDER — NAPROXEN 250 MG/1
500 TABLET ORAL ONCE
Status: COMPLETED | OUTPATIENT
Start: 2018-04-30 | End: 2018-04-30

## 2018-04-30 RX ADMIN — NAPROXEN 500 MG: 250 TABLET ORAL at 21:15

## 2018-04-30 RX ADMIN — OXYCODONE HYDROCHLORIDE AND ACETAMINOPHEN 1 TABLET: 5; 325 TABLET ORAL at 21:15

## 2018-04-30 RX ADMIN — HYDROCODONE BITARTRATE AND ACETAMINOPHEN 1 TABLET: 5; 325 TABLET ORAL at 23:40

## 2018-05-01 PROCEDURE — 99284 EMERGENCY DEPT VISIT MOD MDM: CPT

## 2018-05-01 RX ORDER — OXYCODONE HYDROCHLORIDE AND ACETAMINOPHEN 5; 325 MG/1; MG/1
1 TABLET ORAL EVERY 6 HOURS PRN
Qty: 15 TABLET | Refills: 0 | Status: SHIPPED | OUTPATIENT
Start: 2018-05-01 | End: 2018-05-06

## 2018-05-01 RX ORDER — NAPROXEN 500 MG/1
500 TABLET ORAL 2 TIMES DAILY WITH MEALS
Qty: 20 TABLET | Refills: 0 | Status: SHIPPED | OUTPATIENT
Start: 2018-05-01 | End: 2018-05-16

## 2018-05-01 NOTE — ED PROVIDER NOTES
History  Chief Complaint   Patient presents with    Arm Injury     pt fell today and injured right wrist  went to urgent care, after xray sent to ER     56-year-old female with history of diabetes, hypertension, right-hand dominant presenting chief complaint of abnormal x-ray  The patient works at [employer], she just began working there,  prior to arrival she tripped on uneven pavement on the premises and felt forward with her hands outstretched landing on both of her wrists in her left knee  She had pain in her left hand very minimal abrasion over the ulnar aspect of her left 5th finger she had moderate pain to her right wrist and small contusion to her left knee she fell to the ground but no definite head strike she is not anticoagulated or does not take anti-platelet medication no loss of consciousness she was able to get up and she noticed that she had significant pain primarily to her right wrist she went to an urgent care and had x-rays performed of her left knee, bilateral wrists and there was some concern about possible carpal bone fractures on the right wrist x-ray as well as possible foreign body in her right hand according to the xray, otherwise the images are read as negative  She was sent to the emergency department for further evaluation her main area of concern again is right wrist it radiates into her distal arm and hand is worse when she tries to move it is better with rest associated with swelling but no weakness paresthesias or anesthesia she has no other injuries other than what is no or complaints other than what is noted denies a complete review systems as noted            Prior to Admission Medications   Prescriptions Last Dose Informant Patient Reported? Taking?    AMLODIPINE BESYLATE PO   Yes No   Sig: Take by mouth daily   escitalopram (LEXAPRO) 10 mg tablet   Yes No   Sig: Take 10 mg by mouth daily   hydrOXYzine HCL (ATARAX) 25 mg tablet   No No   Sig: Take 1 tablet (25 mg total) by mouth every 6 (six) hours as needed for anxiety   loratadine (CLARITIN) 10 mg tablet   No No   Sig: Take 1 tablet by mouth daily   metFORMIN (GLUCOPHAGE) 500 mg tablet   Yes No   Sig: Take 500 mg by mouth 2 (two) times a day with meals   mometasone (NASONEX) 50 mcg/act nasal spray   No No   Si sprays into each nostril daily      Facility-Administered Medications: None       Past Medical History:   Diagnosis Date    Cancer ([place] [address] )     Depression     Diabetes mellitus ([place] [address] )     Hypertension        Past Surgical History:   Procedure Laterality Date    HYSTERECTOMY      KIDNEY STONE SURGERY      SKIN GRAFT         History reviewed  No pertinent family history  I have reviewed and agree with the history as documented  Social History   Substance Use Topics    Smoking status: Current Every Day Smoker     Packs/day: 0 20    Smokeless tobacco: Never Used    Alcohol use No        Review of Systems   Constitutional: Negative for chills and fever  Respiratory: Negative for cough and shortness of breath  Cardiovascular: Negative for chest pain and palpitations  Gastrointestinal: Negative for abdominal pain, diarrhea, nausea and vomiting  Genitourinary: Negative for dysuria, frequency and urgency  Musculoskeletal: Positive for joint swelling  Negative for arthralgias, back pain, myalgias, neck pain and neck stiffness  Skin: Positive for color change and wound  Negative for rash  Neurological: Negative for dizziness, weakness, numbness and headaches  Hematological: Negative for adenopathy  Does not bruise/bleed easily  Psychiatric/Behavioral: Negative for agitation and behavioral problems  All other systems reviewed and are negative        Physical Exam  ED Triage Vitals [18]   Temperature Pulse Respirations Blood Pressure SpO2   98 1 °F (36 7 °C) 80 18 169/70 99 %      Temp Source Heart Rate Source Patient Position - Orthostatic VS BP Location FiO2 (%)   Oral Monitor Lying Left arm --      Pain Score       Worst Possible Pain           Orthostatic Vital Signs  Vitals:    04/30/18 2004 04/30/18 2100 04/30/18 2225   BP: 169/70 167/74 150/68   Pulse: 80 78 74   Patient Position - Orthostatic VS: Lying  Sitting       Physical Exam   Constitutional: She is oriented to person, place, and time  She appears well-developed and well-nourished  No distress  HENT:   Head: Normocephalic and atraumatic  Patient's head neck and face are atraumatic   Eyes: EOM are normal  Pupils are equal, round, and reactive to light  Neck: Normal range of motion  Neck supple  No tracheal deviation present  Cardiovascular: Normal rate, regular rhythm and normal heart sounds  Exam reveals no gallop and no friction rub  No murmur heard  Pulmonary/Chest: Effort normal and breath sounds normal  She has no wheezes  She has no rales  Abdominal: Soft  Bowel sounds are normal  She exhibits no distension  There is no tenderness  There is no rebound and no guarding     Musculoskeletal:   Patient's head musculoskeletal exam her head neck or atraumatic she has no midline cervical thoracic or lumbar tenderness there is no tenderness over her back chest or abdomen, she has full range of motion actively and passively of the bilateral lower extremities although she has some mild tenderness over her left patella distal lower extremities neurovascularly intact quadriceps are intact bilaterally, she has full active and passive range of motion of the bilateral upper extremities with the following exceptions she has some mild swelling and pain over her 1st thenar eminence but no discrete pain over the anatomic snuffbox no pain with axial loading there is no pain over the ulnar collateral ligament she has a minimal abrasion over the ulnar aspect of the base of her 5th phalange but otherwise no discrete bony tenderness of the left hand wrist or forearm, distal hands neurovascularly intact, as far as her right upper extremity she has full active and passive range of motion without tenderness the right shoulder humerus elbow and proximal to mid forearm she is moderately tender discretely over the right dorsal wrist skin is intact circumferentially along the wrist hand and fingers despite mention of foreign body on plain films she is not tender over the scaphoid or the remainder of her hand or fingers, compartments are soft distal fingers are neurovascularly intact including AIN PIN, cap refill is brisk, sensation is intact in all 3 distributions there is no injury to the skin on careful exam of the right hand and wrist to suggest acute foreign body or open fracture   Neurological: She is alert and oriented to person, place, and time  No cranial nerve deficit  She exhibits normal muscle tone  Coordination normal    Skin: Skin is warm and dry  No rash noted  Psychiatric: She has a normal mood and affect  Her behavior is normal    Nursing note and vitals reviewed  ED Medications  Medications   naproxen (NAPROSYN) tablet 500 mg (500 mg Oral Given 4/30/18 2115)   oxyCODONE-acetaminophen (PERCOCET) 5-325 mg per tablet 1 tablet (1 tablet Oral Given 4/30/18 2115)   HYDROcodone-acetaminophen (NORCO) 5-325 mg per tablet 1 tablet (1 tablet Oral Given 4/30/18 2340)       Diagnostic Studies  Results Reviewed     None                 XR hand 3+ views LEFT   ED Interpretation by Em James DO (04/30 2302)   No acute abnormality      Final Result by Dannie Snellen, DO (05/01 1788)      No acute osseous abnormality  Workstation performed: [id number]         CT wrist right wo contrast   ED Interpretation by Em James DO (05/01 0002)   Comminuted  triquetrum fracture      Final Result by Roma Dickerson MD (05/01 8557)      Acute comminuted minimally displaced fracture of the dorsal triquetrum  No dislocation        6 x 2 mm calcific foreign body in the volar proximal aspect of the flexor digiti minimi brevis muscle immediately ulnar to the tip of the hook of the hamate  The major findings are in agreement with the preliminary report provided by [facility] which was provided shortly after completion of the exam  The additional finding of  6 x 2 mm presumed chronic foreign body   will now be communicated with    patient's clinical team by our radiology liaison                 Workstation performed: [id number]                    Procedures  Procedures       Phone Contacts  ED Phone Contact    ED Course  ED Course as of May 01 1544   Tue May 01, 2018   0002 Patient has triquetrum fracture it is not dislocated, hand is neurovascularly intact her pain is controlled splint placed by technician neurovascularly intact after evaluation by myself will follow up with orthopedic close return follow-up instructions                                MDM  Number of Diagnoses or Management Options  Closed fracture of triquetrum:   Contusion of left hand, initial encounter:   Diagnosis management comments: 51-year-old female right-hand dominant without past medical history mechanical fall sent over for abnormal x-ray, fell well reportedly at work, discharge her job, reportedly being trialed, no loss of consciousness no anticoagulants or blood thinners no headache complains of right wrist pain left hand pain and left knee pain, evaluated urgent care with abnormal x-ray of her right wrist, head to toe exam she is afebrile normal vital signs she is clinically well-appearing head-to-toe exam significant for swelling on the right dorsal wrist skin is intact circumferentially distal hands neurovascularly intact, there are no poke holes or skin injuries, she has some mild tenderness over left hand with minimal abrasion as well as left patella, formal imaging reports brought concern for possible right carpal bone fracture with foreign body, otherwise images are negative, no other injuries after re-evaluation by myself there is no acute injury concerning for foreign body age indeterminate, although will repeat x-ray of the left hand, will perform CT scan of the right wrist, pain control ice elevation patient will likely require splinting orthopedic follow-up, Occupational Medicine follow-up as this was identified is occurring at work environment,  though disposition pending further evaluation and reassessment    CritCare Time    Disposition  Final diagnoses:   Closed fracture of triquetrum   Contusion of left hand, initial encounter     Time reflects when diagnosis was documented in both MDM as applicable and the Disposition within this note     Time User Action Codes Description Comment    5/1/2018 12:08 AM Lauren Beaver Add [S62 113A] Closed fracture of triquetrum     5/1/2018 12:08 AM Renea Aguilar Add [S60 222A] Contusion of left hand, initial encounter       ED Disposition     ED Disposition Condition Comment    Discharge  2025 [facility] discharge to home/self care      Condition at discharge: Good        Follow-up Information     Follow up With Specialties Details Why 14 [facility] Emergency Department Emergency Medicine   [address] [facility] [address] [place] ED, [address] [place], [place], [address] [address], MD Orthopedic Surgery In 1 week  [facility] [phone]       [address] [place] [address] In 1 day  [address], [address] [place] [address], [address]        Discharge Medication List as of 5/1/2018 12:10 AM      START taking these medications    Details   naproxen (NAPROSYN) 500 mg tablet Take 1 tablet (500 mg total) by mouth 2 (two) times a day with meals for 10 days, Starting Tue 5/1/2018, Until Fri 5/11/2018, Print      oxyCODONE-acetaminophen (PERCOCET) 5-325 mg per tablet Take 1 tablet by mouth every 6 (six) hours as needed for moderate pain for up to 5 days Max Daily Amount: 4 tablets, Starting Tue 5/1/2018, Until Sun 5/6/2018, Print         CONTINUE these medications which have NOT CHANGED    Details   AMLODIPINE BESYLATE PO Take by mouth daily, Historical Med      escitalopram (LEXAPRO) 10 mg tablet Take 10 mg by mouth daily, Until Discontinued, Historical Med      hydrOXYzine HCL (ATARAX) 25 mg tablet Take 1 tablet (25 mg total) by mouth every 6 (six) hours as needed for anxiety, Starting Sun 2/18/2018, Print      loratadine (CLARITIN) 10 mg tablet Take 1 tablet by mouth daily, Starting Mon 11/13/2017, Print      metFORMIN (GLUCOPHAGE) 500 mg tablet Take 500 mg by mouth 2 (two) times a day with meals, Until Discontinued, Historical Med      mometasone (NASONEX) 50 mcg/act nasal spray 2 sprays into each nostril daily, Starting Mon 11/13/2017, Print           No discharge procedures on file      ED Provider  Electronically Signed by           Winnie Hedrick DO  05/01/18 9148

## 2018-05-01 NOTE — ED NOTES
Patient transported to [address] [place] [address] , [facility]  04/30/18 3859

## 2018-05-01 NOTE — DISCHARGE INSTRUCTIONS
Please keep the arm splinted please return if you have severe numbness weakness tingling color changes or other concerning symptoms otherwise the arm is to remain splinted the follow up with a hand surgeon for further evaluation as instructed  As discussed your to follow up with the occupational medicine doctor in 24-48 hours for further evaluation as discussed       Wrist Fracture in [facility]:   A wrist fracture is a break in one or more of the bones in your wrist    DISCHARGE INSTRUCTIONS:   Return to the emergency department if:   · Your pain gets worse or does not get better after you take pain medicine  · Your cast or splint breaks, gets wet, or is damaged  · Your hand or fingers feel numb or cold  · Your hand or fingers turn white or blue  · Your splint or cast feels too tight  · You have more pain or swelling after the cast or splint is put on  Contact your healthcare provider if:   · You have a fever  · There is a foul smell or blood coming from under the cast     · You have questions or concerns about your condition or care  Medicines:   · Prescription pain medicine  may be given  Ask your healthcare provider how to take this medicine safely  Some prescription pain medicines contain acetaminophen  Do not take other medicines that contain acetaminophen without talking to your healthcare provider  Too much acetaminophen may cause liver damage  Prescription pain medicine may cause constipation  Ask your healthcare provider how to prevent or treat constipation  · NSAIDs , such as ibuprofen, help decrease swelling, pain, and fever  NSAIDs can cause stomach bleeding or kidney problems in certain people  If you take blood thinner medicine, always ask your healthcare provider if NSAIDs are safe for you  Always read the medicine label and follow directions  · Acetaminophen  decreases pain and fever  It is available without a doctor's order   Ask how much to take and how often to take it  Follow directions  Read the labels of all other medicines you are using to see if they also contain acetaminophen, or ask your doctor or pharmacist  Acetaminophen can cause liver damage if not taken correctly  Do not use more than 4 grams (4,000 milligrams) total of acetaminophen in one day  · Take your medicine as directed  Contact your healthcare provider if you think your medicine is not helping or if you have side effects  Tell him or her if you are allergic to any medicine  Keep a list of the medicines, vitamins, and herbs you take  Include the amounts, and when and why you take them  Bring the list or the pill bottles to follow-up visits  Carry your medicine list with you in case of an emergency  Self-care:   · Rest  as much as possible  Do not play contact sports until the healthcare provider says it is okay  · Apply ice  on your wrist for 15 to 20 minutes every hour or as directed  Use an ice pack, or put crushed ice in a plastic bag  Cover it with a towel before you place it on your skin  Ice helps prevent tissue damage and decreases swelling and pain  · Elevate  your wrist above the level of your heart as often as possible  This will help decrease swelling and pain  Prop your wrist on pillows or blankets to keep it elevated comfortably  Cast or splint care:   · You may take a bath or shower as directed  Do not let your cast or splint get wet  Before bathing, cover the cast or splint with 2 plastic trash bags  Tape the bags to your skin above the cast or splint to seal out the water  Keep your arm out of the water in case the bag breaks  If a plaster cast gets wet and soft, call your healthcare provider  · Check the skin around the cast or splint every day  You may put lotion on any red or sore areas  · Do not push down or lean on the cast or brace because it may break      · Do not  scratch the skin under the cast by putting a sharp or pointed object inside the cast   Go to physical therapy as directed: You may need physical therapy after your wrist heals and the cast is removed  A physical therapist can teach you exercises to help improve movement and strength and to decrease pain  Follow up with your healthcare provider or bone specialist as directed: You may need to return to have your cast removed  You may also need an x-ray to check how well the bone has healed  Write down your questions so you remember to ask them during your visits  © 2017 [address] [employer] Information is for End User's use only and may not be sold, redistributed or otherwise used for commercial purposes  All illustrations and images included in CareNotes® are the copyrighted property of A D A M , Inc  or [employer]  The above information is an  only  It is not intended as medical advice for individual conditions or treatments  Talk to your doctor, nurse or pharmacist before following any medical regimen to see if it is safe and effective for you

## 2018-05-02 ENCOUNTER — OFFICE VISIT (OUTPATIENT)
Dept: OBGYN CLINIC | Facility: CLINIC | Age: 58
End: 2018-05-02
Payer: COMMERCIAL

## 2018-05-02 VITALS
HEART RATE: 81 BPM | BODY MASS INDEX: 34.01 KG/M2 | HEIGHT: 64 IN | WEIGHT: 199.2 LBS | SYSTOLIC BLOOD PRESSURE: 139 MMHG | DIASTOLIC BLOOD PRESSURE: 82 MMHG

## 2018-05-02 DIAGNOSIS — S62.114A CLOSED NONDISPLACED FRACTURE OF TRIQUETRUM OF RIGHT WRIST, INITIAL ENCOUNTER: ICD-10-CM

## 2018-05-02 DIAGNOSIS — M79.641 HAND PAIN, RIGHT: Primary | ICD-10-CM

## 2018-05-02 PROCEDURE — 99213 OFFICE O/P EST LOW 20 MIN: CPT | Performed by: ORTHOPAEDIC SURGERY

## 2018-05-09 PROBLEM — A69.23 LYME ARTHRITIS (HCC): Status: ACTIVE | Noted: 2017-11-21

## 2018-05-09 PROBLEM — I10 ESSENTIAL (PRIMARY) HYPERTENSION: Status: ACTIVE | Noted: 2017-10-31

## 2018-05-09 PROBLEM — K58.9 IBS (IRRITABLE BOWEL SYNDROME): Status: ACTIVE | Noted: 2017-10-31

## 2018-05-09 PROBLEM — C44.99 DERMATOFIBROSARCOMA: Status: ACTIVE | Noted: 2017-10-31

## 2018-05-09 PROBLEM — E11.9 TYPE 2 DIABETES MELLITUS WITHOUT COMPLICATION (HCC): Status: ACTIVE | Noted: 2017-10-31

## 2018-05-09 PROBLEM — G25.0 ESSENTIAL TREMOR: Status: ACTIVE | Noted: 2017-10-31

## 2018-05-09 PROBLEM — J45.909 ALLERGIC ASTHMA: Status: ACTIVE | Noted: 2017-10-31

## 2018-05-09 PROBLEM — E78.5 HYPERLIPIDEMIA: Status: ACTIVE | Noted: 2017-10-31

## 2018-05-09 NOTE — PROGRESS NOTES
HPI:  Patient is a 56y o  year old RHD female who presents with chief complaint of Wrist Pain  Patient complains of right wrist pain  The pain is moderate, worsens with movement, and some relief by rest   There is not associated numbness, tingling in the right hand and fingers  DOI 4/30/2018  Patient was at work at the [employer]  She tripped on some uneven pavement and fell forward on to her outstretched hands and her left knee  She developed a minimal abrasion over the ulnar aspect of the left 5th digit and some minimal left hand pain  She suffered an injury to the right wrist with moderate pain  And she had small contusion to the left knee  She went to urgent care on the date of her fall and had x-rays performed of the left knee and bilateral wrist   The urgent care referred her to the emergency room as her right wrist x-rays were remarkable for or possible fracture and possible foreign body  Right wrist CT scan at the emergency room confirmed a minimally displaced acute fracture of the dorsal aspect of the triquetrum  It also confirmed a 6 by 2 millimeter calcific foreign body in the volar proximal aspect of the flexor digiti minimi brevis musculature  Patient was splinted and presents here for follow-up  The Ct is reviewed in office today      ROS:   General: No fever, no chills, no weight loss, no weight gain  HEENT:  No loss of hearing, no nose bleeds, no sore throat  Eyes:  No eye pain, no red eyes, no visual disturbance  Respiratory:  No cough, no shortness of breath, Positive wheezing  Cardiovascular:  No chest pain, no palpitations, no edema  GI: No abdominal pain, no nausea, no vomiting  Endocrine: No frequent urination, Positive excessive thirst  Urinary:  No dysuria, Positive hematuria, no incontinence  Musculoskeletal: see HPI   Skin:  No rash, no wounds  Neurological:  No dizziness, no headache, no numbness  Psychiatric:  Positive difficulty concentrating, no depression, no suicide thoughts, Positive anxiety  Review of all other systems is negative    PMH:  Past Medical History:   Diagnosis Date    Asthma     Cancer ([facility] )     Depression     Diabetes insipidus ([facility] )     Diabetes mellitus ([facility] )     Hypertension     Stomach disorder        PSH:  Past Surgical History:   Procedure Laterality Date    BREAST LUMPECTOMY      CHOLECYSTECTOMY      HYSTERECTOMY      KIDNEY STONE SURGERY      SKIN GRAFT         Medications:  Current Outpatient Prescriptions   Medication Sig Dispense Refill    AMLODIPINE BESYLATE PO Take by mouth daily      escitalopram (LEXAPRO) 10 mg tablet Take 10 mg by mouth daily      hydrOXYzine HCL (ATARAX) 25 mg tablet Take 1 tablet (25 mg total) by mouth every 6 (six) hours as needed for anxiety 12 tablet 0    loratadine (CLARITIN) 10 mg tablet Take 1 tablet by mouth daily 20 tablet 0    metFORMIN (GLUCOPHAGE) 500 mg tablet Take 500 mg by mouth 2 (two) times a day with meals      mometasone (NASONEX) 50 mcg/act nasal spray 2 sprays into each nostril daily 17 g 0    naproxen (NAPROSYN) 500 mg tablet Take 1 tablet (500 mg total) by mouth 2 (two) times a day with meals for 10 days 20 tablet 0     No current facility-administered medications for this visit  Allergies:  No Known Allergies    Family History:  Family History   Problem Relation Age of Onset    Cancer Mother     Heart attack Mother     Cancer Father        Social History:  Social History     Occupational History    Not on file  Social History Main Topics    Smoking status: Current Every Day Smoker     Packs/day: 0 20    Smokeless tobacco: Never Used    Alcohol use Yes    Drug use: No    Sexual activity: Not on file       Physical Exam:  General :  Alert, cooperative, no distress, appears stated age  Blood pressure 139/82, pulse 81, height 5' 4 02" (1 626 m), weight 90 4 kg (199 lb 3 2 oz)     Head:  Normocephalic, without obvious abnormality, atraumatic   Eyes:  Conjunctiva/corneas clear, EOM's intact,   Ears: Both ears normal appearance, no hearing deficits  Nose: Nares normal, septum midline, no drainage    Neck: Supple,  trachea midline, no adenopathy, no tenderness, no mass   Back:   Symmetric, no curvature, ROM normal, no tenderness   Lungs:   Respirations unlabored   Chest Wall:  No tenderness or deformity   Extremities: Extremities normal, atraumatic, no cyanosis or edema      Pulses: 2+ and symmetric   Skin: Skin color, texture, turgor normal, no rashes or lesions      Neurologic: Normal           Ortho Exam  Right Wrist - Positive right dorso/ulnar wrist tenderness  Skin is intact  Postive mild swelling and ecchymosis  Neurovascularly intact including AIN, PIN, median  Radial and ulnar sensation  Good capillary refill  No tenderness over the flexor digiti minimi brevis or ulnar aspect of the hand  No evidence of acute foreign body  Assessment  Encounter Diagnoses   Name Primary?     Hand pain, right Yes    Closed nondisplaced fracture of triquetrum of right wrist, initial encounter          Plan:  Wrist Fracture Brace   F/U 2 weeks

## 2018-05-16 ENCOUNTER — APPOINTMENT (OUTPATIENT)
Dept: RADIOLOGY | Facility: CLINIC | Age: 58
End: 2018-05-16
Payer: COMMERCIAL

## 2018-05-16 ENCOUNTER — OFFICE VISIT (OUTPATIENT)
Dept: OBGYN CLINIC | Facility: CLINIC | Age: 58
End: 2018-05-16

## 2018-05-16 VITALS
HEIGHT: 65 IN | BODY MASS INDEX: 33.82 KG/M2 | WEIGHT: 203 LBS | SYSTOLIC BLOOD PRESSURE: 132 MMHG | HEART RATE: 84 BPM | DIASTOLIC BLOOD PRESSURE: 81 MMHG

## 2018-05-16 DIAGNOSIS — S62.114S: Primary | ICD-10-CM

## 2018-05-16 DIAGNOSIS — S62.114S: ICD-10-CM

## 2018-05-16 PROCEDURE — 73100 X-RAY EXAM OF WRIST: CPT

## 2018-05-16 PROCEDURE — 99024 POSTOP FOLLOW-UP VISIT: CPT | Performed by: ORTHOPAEDIC SURGERY

## 2018-05-16 RX ORDER — NAPROXEN 500 MG/1
250 TABLET ORAL 2 TIMES DAILY WITH MEALS
COMMUNITY
End: 2018-07-10

## 2018-05-16 NOTE — PROGRESS NOTES
Chief Complaint   Patient presents with    Right Hand - Follow-up         Subjective   Patient here for follow-up right wrist injury  Patient fell at work on April 30, 2018  She has been in a fracture brace since last visit  Here today for repeat x-rays  Patient feels the fracture brace is a little too long as it has been digging into her proximal forearm causing bruising  Other than that patient has been feeling okay  Some tenderness noted at the wrist  Denies numbness or tingling upper extremity      ROS:   General: no fever, no chills  Respiratory:  No coughing, shortness of breath or wheezing  Cardiovascular:  No chest pain, no palpitations  Musculoskeletal: see HPI and PE  SKIN:  No skin rash, no dry skin  Neurological:  positive for dizziness and headaches  Psychiatric:  No suicide thoughts, no anxiety, no depression  Review of all other systems is negative    Past Medical History:   Diagnosis Date    Asthma     Cancer ([facility] )     Depression     Diabetes insipidus ([place] [address] )     Diabetes mellitus ([place] [address] )     Hypertension     Stomach disorder        Current Outpatient Prescriptions on File Prior to Visit   Medication Sig Dispense Refill    AMLODIPINE BESYLATE PO Take by mouth daily      escitalopram (LEXAPRO) 10 mg tablet Take 10 mg by mouth daily      hydrOXYzine HCL (ATARAX) 25 mg tablet Take 1 tablet (25 mg total) by mouth every 6 (six) hours as needed for anxiety 12 tablet 0    loratadine (CLARITIN) 10 mg tablet Take 1 tablet by mouth daily 20 tablet 0    metFORMIN (GLUCOPHAGE) 500 mg tablet Take 500 mg by mouth 2 (two) times a day with meals      mometasone (NASONEX) 50 mcg/act nasal spray 2 sprays into each nostril daily 17 g 0    [DISCONTINUED] naproxen (NAPROSYN) 500 mg tablet Take 1 tablet (500 mg total) by mouth 2 (two) times a day with meals for 10 days 20 tablet 0     No current facility-administered medications on file prior to visit          No Known Allergies    Social History Physical Exam:    Vitals:    05/16/18 1132   BP: 132/81   Pulse: 84   Weight: 92 1 kg (203 lb)   Height: 5' 5" (1 651 m)       General Appearance:  Alert, cooperative, no distress, appears stated age   Lungs:   respirations unlabored   Heart:  Normal heart rate noted   Abdomen:   Soft, non-tender,  no masses   Extremities: Extremities normal, atraumatic, no cyanosis or edema   Pulses: 2+ and symmetric   Skin: Skin color, texture, turgor normal, no rashes or lesions   Neurologic: Normal         Ortho Exam: Right wrist Exam shows skin intact with no erythema noted  Tenderness noted mainly on the dorsal aspect of the hand/wrist   mild swelling and ecchymosis  Neurovascularly intact distally  Good capillary refill     full pain-free range of motion of the right elbow and all fingers  CT right wrist   cute comminuted minimally displaced fracture of the dorsal triquetrum  No dislocation  6 x 2 mm calcific foreign body in the volar proximal aspect of the flexor digiti minimi brevis muscle immediately ulnar to the tip of the hook of the hamate  The major findings are in agreement with the preliminary report provided by [facility] which was provided shortly after completion of the exam  The additional finding of  6 x 2 mm presumed chronic foreign body   will now be communicated with   patient's clinical team by our radiology liaison  ASSESSMENT:    Brandon Pride was seen today for follow-up  Diagnoses and all orders for this visit:    Closed nondisplaced fracture of triquetrum of right wrist, sequela  -     XR wrist 2 vw right; Future          PLAN:  Closed nondisplaced fracture right triquetrum  X-rays today show stable fracture  Fracture brace a little too long causing discomfort  We adjusted the fracture brace and made it is smaller  Patient was comfortable with the change and had no discomfort  She will continue with the fracture brace and follow up in three weeks

## 2018-06-06 ENCOUNTER — OFFICE VISIT (OUTPATIENT)
Dept: OBGYN CLINIC | Facility: CLINIC | Age: 58
End: 2018-06-06
Payer: COMMERCIAL

## 2018-06-06 ENCOUNTER — APPOINTMENT (OUTPATIENT)
Dept: RADIOLOGY | Facility: CLINIC | Age: 58
End: 2018-06-06
Payer: COMMERCIAL

## 2018-06-06 VITALS
BODY MASS INDEX: 33.42 KG/M2 | HEART RATE: 78 BPM | DIASTOLIC BLOOD PRESSURE: 77 MMHG | SYSTOLIC BLOOD PRESSURE: 128 MMHG | HEIGHT: 65 IN | WEIGHT: 200.6 LBS

## 2018-06-06 DIAGNOSIS — S62.114S: Primary | ICD-10-CM

## 2018-06-06 DIAGNOSIS — S62.114S: ICD-10-CM

## 2018-06-06 PROCEDURE — 73100 X-RAY EXAM OF WRIST: CPT

## 2018-06-06 PROCEDURE — 99213 OFFICE O/P EST LOW 20 MIN: CPT | Performed by: ORTHOPAEDIC SURGERY

## 2018-06-06 NOTE — PROGRESS NOTES
_____________________________________________________  CHIEF COMPLAINT:  Chief Complaint   Patient presents with    Right Wrist - Follow-up         SUBJECTIVE:  Evi Gates is a 56y o  year old female who presents right wrist pain  She states that her pain has gotten substantially better  She will E notices pain with certain motions of her wrist   She continues to wear her brace all the time  She takes some Tylenol as needed for pain  She denies any numbness or tingling  He denies any constitutional signs or symptoms       PAST MEDICAL HISTORY:  Past Medical History:   Diagnosis Date    Asthma     Cancer ([facility] )     Depression     Diabetes insipidus ([facility] )     Diabetes mellitus ([facility] )     Hypertension     Stomach disorder        PAST SURGICAL HISTORY:  Past Surgical History:   Procedure Laterality Date    BREAST LUMPECTOMY      CHOLECYSTECTOMY      HYSTERECTOMY      KIDNEY STONE SURGERY      SKIN GRAFT         FAMILY HISTORY:  Family History   Problem Relation Age of Onset    Cancer Mother     Heart attack Mother     Cancer Father        SOCIAL HISTORY:  Social History   Substance Use Topics    Smoking status: Current Every Day Smoker     Packs/day: 0 20    Smokeless tobacco: Never Used    Alcohol use Yes       MEDICATIONS:    Current Outpatient Prescriptions:     AMLODIPINE BESYLATE PO, Take by mouth daily, Disp: , Rfl:     escitalopram (LEXAPRO) 10 mg tablet, Take 10 mg by mouth daily, Disp: , Rfl:     hydrOXYzine HCL (ATARAX) 25 mg tablet, Take 1 tablet (25 mg total) by mouth every 6 (six) hours as needed for anxiety, Disp: 12 tablet, Rfl: 0    loratadine (CLARITIN) 10 mg tablet, Take 1 tablet by mouth daily, Disp: 20 tablet, Rfl: 0    metFORMIN (GLUCOPHAGE) 500 mg tablet, Take 500 mg by mouth 2 (two) times a day with meals, Disp: , Rfl:     mometasone (NASONEX) 50 mcg/act nasal spray, 2 sprays into each nostril daily, Disp: 17 g, Rfl: 0    naproxen (NAPROSYN) 500 mg tablet, Take 250 mg by mouth 2 (two) times a day with meals, Disp: , Rfl:     PROAIR  (90 Base) MCG/ACT inhaler, Inhale 2 puffs every 4 (four) hours as needed for wheezing, Disp: , Rfl: 3    ALLERGIES:  No Known Allergies    REVIEW OF SYSTEMS:  General: no fever, no chills  HEENT:  No loss of hearing or eyesight problems  Eyes:  No red eyes  Respiratory:  No coughing, shortness of breath or wheezing  Cardiovascular:  No chest pain, no palpitations  GI:  Abdomen soft nontender, denies nausea  Endocrine:  No muscle weakness, no frequent urination, no excessive thirst  Urinary:  No dysuria, no incontinence  Musculoskeletal: see HPI and PE  SKIN:  No skin rash, no dry skin  Neurological:  No headaches, no confusion  Psychiatric:  No suicide thoughts, no anxiety, no depression  Review of all other systems is negative    LABS:  HgA1c: No results found for: HGBA1C  BMP:   Lab Results   Component Value Date    GLUCOSE 177 (H) 02/17/2018    CALCIUM 9 3 02/17/2018     02/17/2018    K 3 7 02/17/2018    CO2 27 02/17/2018     02/17/2018    BUN 16 02/17/2018    CREATININE 0 85 02/17/2018       _____________________________________________________  PHYSICAL EXAMINATION:  General: well developed and well nourished, alert, oriented times 3 and appears comfortable  Psychiatric: Normal  HEENT: Trachea Midline, No torticollis  Cardiovascular: No discernable arrhythmia  Pulmonary: No wheezing or stridor  Skin: No masses, erthema, lacerations, fluctation, ulcerations  Neurovascular: Sensation Intact to the Median, Ulnar, Radial Nerve, Motor Intact to the Median, Ulnar, Radial Nerve and Pulses Intact    MUSCULOSKELETAL EXAMINATION:  Right wrist:  No erythema edema or ecchymosis noted  Patient is tender to palpation over the dorsal aspect of the triquetrum  She does have decreased range of motion secondary to pain and stiffness  Negative Tinel sign for median nerve, negative trigger finger, flexor and extensor tendons are intact  The patient is neurovascularly intact     _____________________________________________________  STUDIES REVIEWED:  X-rays of the right wrist demonstrate callus formation over the dorsal aspect of the triquetrum  This was compared to previous x-rays which show good alignment  ASSESSMENT/PLAN:    Diagnoses and all orders for this visit:    Closed nondisplaced fracture of triquetrum of right wrist, sequela  -     XR wrist 2 vw right; Future        Assessment:   Closed nondisplaced fracture of triquetrum right wrist     Plan: Will have the patient continue her wrist brace for another 2 weeks    She may then start to come out of it and start some gentle range of motion at her wrist   We will have her follow up in 4 weeks to see how wrist range of motion is as well as to check a new x-ray of her right wrist     Follow Up:  Four weeks    PROCEDURES PERFORMED:  None

## 2018-07-10 ENCOUNTER — OFFICE VISIT (OUTPATIENT)
Dept: OBGYN CLINIC | Facility: CLINIC | Age: 58
End: 2018-07-10

## 2018-07-10 ENCOUNTER — APPOINTMENT (OUTPATIENT)
Dept: RADIOLOGY | Facility: CLINIC | Age: 58
End: 2018-07-10
Payer: COMMERCIAL

## 2018-07-10 VITALS
SYSTOLIC BLOOD PRESSURE: 135 MMHG | HEIGHT: 65 IN | WEIGHT: 202.4 LBS | BODY MASS INDEX: 33.72 KG/M2 | HEART RATE: 83 BPM | DIASTOLIC BLOOD PRESSURE: 82 MMHG

## 2018-07-10 DIAGNOSIS — S62.114S: ICD-10-CM

## 2018-07-10 DIAGNOSIS — S62.114S: Primary | ICD-10-CM

## 2018-07-10 PROCEDURE — 73100 X-RAY EXAM OF WRIST: CPT

## 2018-07-10 PROCEDURE — 99024 POSTOP FOLLOW-UP VISIT: CPT | Performed by: ORTHOPAEDIC SURGERY

## 2018-07-10 NOTE — LETTER
July 10, 2018     Patient: Johnna Espinoza   YOB: 1960   Date of Visit: 7/10/2018       To Whom it May Concern:    April Martin is under my professional care  She was seen in my office on 7/10/2018  She may return to work on July 16, 2018 with no restrictions  If you have any questions or concerns, please don't hesitate to call           Sincerely,          Manda Guan MD        CC: No Recipients

## 2018-07-10 NOTE — PROGRESS NOTES
Chief Complaint   Patient presents with    Right Wrist - Follow-up         Subjective   Here for follow up from fall at work on April 30, 2018  Is feeling better today  Has been out of the brace for the past couple weeks  Doing all activities with no complaints of pain  Denies numbness or tingling  ROS:   General: no fever, no chills  Respiratory:  No coughing, shortness of breath or wheezing  Musculoskeletal: see HPI and PE  Psychiatric:  Positive for anxiety, no depression  Review of all other systems is negative      Current Outpatient Prescriptions on File Prior to Visit   Medication Sig Dispense Refill    AMLODIPINE BESYLATE PO Take by mouth daily      escitalopram (LEXAPRO) 10 mg tablet Take 10 mg by mouth daily      loratadine (CLARITIN) 10 mg tablet Take 1 tablet by mouth daily 20 tablet 0    metFORMIN (GLUCOPHAGE) 500 mg tablet Take 500 mg by mouth 2 (two) times a day with meals      PROAIR  (90 Base) MCG/ACT inhaler Inhale 2 puffs every 4 (four) hours as needed for wheezing  3    [DISCONTINUED] hydrOXYzine HCL (ATARAX) 25 mg tablet Take 1 tablet (25 mg total) by mouth every 6 (six) hours as needed for anxiety 12 tablet 0    [DISCONTINUED] mometasone (NASONEX) 50 mcg/act nasal spray 2 sprays into each nostril daily 17 g 0    [DISCONTINUED] naproxen (NAPROSYN) 500 mg tablet Take 250 mg by mouth 2 (two) times a day with meals       No current facility-administered medications on file prior to visit  No Known Allergies      Physical Exam:    Vitals:    07/10/18 0846   BP: 135/82   Pulse: 83   Weight: 91 8 kg (202 lb 6 4 oz)   Height: 5' 5" (1 651 m)       General Appearance:  Alert, cooperative, no distress, appears stated age   Pulses: 2+ and symmetric   Skin: Skin color, texture, turgor normal, no rashes or lesions   Neurologic: Normal       Ortho Exam:     Right wrist examination with skin intact  No erythema or ecchymosis noted    mild swelling noted with no tenderness noted with palpation  Neurovascularly intact distally  Nearly full ROM noted when compared to opposite side  full pain-free range of motion of the right elbow and all fingers  X-rays: Right wrist shows good callus form,ation, healed fracture, good alignment  ASSESSMENT:    Cecily Diane was seen today for follow-up  Diagnoses and all orders for this visit:    Closed nondisplaced fracture of triquetrum of right wrist, sequela  -     XR wrist 2 vw right; Future      PLAN:  Closed nondisplaced healed fracture right triquetrum  X-rays today show stable fracture  Will return to work full duty on Monday   Will do home exercise program  Follow up with  Us as needed

## 2018-11-02 ENCOUNTER — HOSPITAL ENCOUNTER (EMERGENCY)
Facility: HOSPITAL | Age: 58
Discharge: HOME | End: 2018-11-02
Attending: EMERGENCY MEDICINE
Payer: COMMERCIAL

## 2018-11-02 VITALS
TEMPERATURE: 98.4 F | HEART RATE: 77 BPM | DIASTOLIC BLOOD PRESSURE: 69 MMHG | BODY MASS INDEX: 34.14 KG/M2 | SYSTOLIC BLOOD PRESSURE: 151 MMHG | WEIGHT: 199.96 LBS | HEIGHT: 64 IN | RESPIRATION RATE: 18 BRPM | OXYGEN SATURATION: 98 %

## 2018-11-02 DIAGNOSIS — A69.20 LYME DISEASE: ICD-10-CM

## 2018-11-02 DIAGNOSIS — R53.83 FATIGUE: ICD-10-CM

## 2018-11-02 DIAGNOSIS — M79.10 MYALGIA: Primary | ICD-10-CM

## 2018-11-02 LAB
ALBUMIN SERPL BCP-MCNC: 3.7 G/DL (ref 3.5–5)
ALP SERPL-CCNC: 92 U/L (ref 46–116)
ALT SERPL W P-5'-P-CCNC: 37 U/L (ref 12–78)
ANION GAP SERPL CALCULATED.3IONS-SCNC: 7 MMOL/L (ref 4–13)
AST SERPL W P-5'-P-CCNC: 22 U/L (ref 5–45)
BASOPHILS # BLD AUTO: 0.05 THOUSANDS/ΜL (ref 0–0.1)
BASOPHILS NFR BLD AUTO: 1 % (ref 0–1)
BILIRUB SERPL-MCNC: 0.5 MG/DL (ref 0.2–1)
BUN SERPL-MCNC: 12 MG/DL (ref 5–25)
CALCIUM SERPL-MCNC: 9.4 MG/DL (ref 8.3–10.1)
CHLORIDE SERPL-SCNC: 104 MMOL/L (ref 100–108)
CO2 SERPL-SCNC: 30 MMOL/L (ref 21–32)
CREAT SERPL-MCNC: 0.78 MG/DL (ref 0.6–1.3)
EOSINOPHIL # BLD AUTO: 0.21 THOUSAND/ΜL (ref 0–0.61)
EOSINOPHIL NFR BLD AUTO: 2 % (ref 0–6)
ERYTHROCYTE [DISTWIDTH] IN BLOOD BY AUTOMATED COUNT: 13 % (ref 11.6–15.1)
GFR SERPL CREATININE-BSD FRML MDRD: 85 ML/MIN/1.73SQ M
GLUCOSE SERPL-MCNC: 77 MG/DL (ref 65–140)
HCT VFR BLD AUTO: 44.7 % (ref 34.8–46.1)
HGB BLD-MCNC: 15.1 G/DL (ref 11.5–15.4)
IMM GRANULOCYTES # BLD AUTO: 0.03 THOUSAND/UL (ref 0–0.2)
IMM GRANULOCYTES NFR BLD AUTO: 0 % (ref 0–2)
LYMPHOCYTES # BLD AUTO: 3.11 THOUSANDS/ΜL (ref 0.6–4.47)
LYMPHOCYTES NFR BLD AUTO: 35 % (ref 14–44)
MCH RBC QN AUTO: 29.4 PG (ref 26.8–34.3)
MCHC RBC AUTO-ENTMCNC: 33.8 G/DL (ref 31.4–37.4)
MCV RBC AUTO: 87 FL (ref 82–98)
MONOCYTES # BLD AUTO: 0.84 THOUSAND/ΜL (ref 0.17–1.22)
MONOCYTES NFR BLD AUTO: 9 % (ref 4–12)
NEUTROPHILS # BLD AUTO: 4.78 THOUSANDS/ΜL (ref 1.85–7.62)
NEUTS SEG NFR BLD AUTO: 53 % (ref 43–75)
NRBC BLD AUTO-RTO: 0 /100 WBCS
PLATELET # BLD AUTO: 221 THOUSANDS/UL (ref 149–390)
PMV BLD AUTO: 11.6 FL (ref 8.9–12.7)
POTASSIUM SERPL-SCNC: 4.5 MMOL/L (ref 3.5–5.3)
PROT SERPL-MCNC: 7.5 G/DL (ref 6.4–8.2)
RBC # BLD AUTO: 5.13 MILLION/UL (ref 3.81–5.12)
SODIUM SERPL-SCNC: 141 MMOL/L (ref 136–145)
TROPONIN I SERPL-MCNC: <0.02 NG/ML
TSH SERPL DL<=0.05 MIU/L-ACNC: 2.28 UIU/ML (ref 0.36–3.74)
WBC # BLD AUTO: 9.02 THOUSAND/UL (ref 4.31–10.16)

## 2018-11-02 PROCEDURE — 80053 COMPREHEN METABOLIC PANEL: CPT | Performed by: EMERGENCY MEDICINE

## 2018-11-02 PROCEDURE — 84484 ASSAY OF TROPONIN QUANT: CPT | Performed by: EMERGENCY MEDICINE

## 2018-11-02 PROCEDURE — 85025 COMPLETE CBC W/AUTO DIFF WBC: CPT | Performed by: EMERGENCY MEDICINE

## 2018-11-02 PROCEDURE — 84443 ASSAY THYROID STIM HORMONE: CPT | Performed by: EMERGENCY MEDICINE

## 2018-11-02 PROCEDURE — 99285 EMERGENCY DEPT VISIT HI MDM: CPT

## 2018-11-02 PROCEDURE — 36415 COLL VENOUS BLD VENIPUNCTURE: CPT | Performed by: EMERGENCY MEDICINE

## 2018-11-02 PROCEDURE — 93005 ELECTROCARDIOGRAM TRACING: CPT

## 2018-11-02 RX ORDER — NAPROXEN 500 MG/1
500 TABLET ORAL 2 TIMES DAILY WITH MEALS
Qty: 28 TABLET | Refills: 0 | Status: SHIPPED | OUTPATIENT
Start: 2018-11-02 | End: 2019-05-13 | Stop reason: ALTCHOICE

## 2018-11-02 NOTE — ED PROVIDER NOTES
History  Chief Complaint   Patient presents with    Weakness - Generalized     Patient presents with progressive fatigue and weakness  Was recently diagnosed with lymes disease  Patient also with c/o of burning and stabbing pain in back and mouth  HPI   29-year-old female with history of Lyme disease, irritable bowel syndrome with both constipation and diarrhea, cancer with growth removed from her neck in May of this year with negative margins not requiring radiation or chemotherapy, hypertension, and diabetes, who presents with multiple complaints  Patient states that for the last 25 years she has been experiencing a constellation of symptoms including intermittent stabbing and burning pain in her flanks, stabbing and burning pain in her mouth, diarrhea, intermittent confusion, and intermittent blurry vision  The stabbing and burning pain occurs multiple times a day, reports that the burning pain is now present over both flanks, also present in her mouth  This has been present for years but is worsening  Reports 4 episodes of occasionally watery diarrhea daily, has seen her PCP for this, was diagnosed with irritable bowel syndrome, but no other conditions  Denies abdominal pain, no blood in her stool or dark tarry stools, no recent antibiotic use, drinking from non potable water sources, or travel outside the country  Intermittent blurry vision is new over the last 3 weeks, describes her vision becoming blurry for a few minutes, then resolving  She denies blurry vision currently  No double vision  States that she has intermittently felt confused over the last several months, stating that she sometimes forgets what she is doing, but then remembers shortly thereafter  Denies headache or neck pain  No fevers or chills  No rashes  Reports progressive fatigue, generalized myalgias, and generalized weakness  No focal weakness    Patient was recently diagnosed with Lyme disease by Western blot on the 26th of October by her PCP (positive IgG, negative IgM)  She was not treated with antibiotics as this was thought to be chronic, and has a follow-up appointment scheduled with a Lyme disease specialist in [place] on November 6th  States that she came in today because I just want to feel better      Prior to Admission Medications   Prescriptions Last Dose Informant Patient Reported? Taking? AMLODIPINE BESYLATE PO  Self Yes No   Sig: Take by mouth daily   PROAIR  (90 Base) MCG/ACT inhaler  Self Yes No   Sig: Inhale 2 puffs every 4 (four) hours as needed for wheezing   escitalopram (LEXAPRO) 10 mg tablet  Self Yes No   Sig: Take 10 mg by mouth daily   loratadine (CLARITIN) 10 mg tablet  Self No No   Sig: Take 1 tablet by mouth daily   metFORMIN (GLUCOPHAGE) 500 mg tablet  Self Yes No   Sig: Take 500 mg by mouth 2 (two) times a day with meals      Facility-Administered Medications: None       Past Medical History:   Diagnosis Date    Asthma     Cancer ([facility] )     Depression     Diabetes insipidus ([facility] )     Diabetes mellitus ([facility] )     Hypertension     Stomach disorder        Past Surgical History:   Procedure Laterality Date    BREAST LUMPECTOMY      CHOLECYSTECTOMY      HYSTERECTOMY      KIDNEY STONE SURGERY      SKIN GRAFT         Family History   Problem Relation Age of Onset    Cancer Mother     Heart attack Mother     Cancer Father      I have reviewed and agree with the history as documented  Social History   Substance Use Topics    Smoking status: Current Every Day Smoker     Packs/day: 0 20    Smokeless tobacco: Never Used    Alcohol use Yes        Review of Systems   Constitutional: Positive for fatigue  Negative for chills and fever  HENT: Negative for congestion, rhinorrhea, sinus pain and sore throat  Eyes: Positive for visual disturbance (intermittent, not present currently)  Negative for photophobia, pain, discharge, redness and itching     Respiratory: Negative for cough, shortness of breath and wheezing  Cardiovascular: Negative for chest pain, palpitations and leg swelling  Gastrointestinal: Positive for diarrhea  Negative for abdominal pain, nausea and vomiting  Genitourinary: Negative for dysuria, flank pain and frequency  Musculoskeletal: Positive for myalgias (diffuse)  Negative for arthralgias, back pain, joint swelling, neck pain and neck stiffness  Skin: Negative for rash  Neurological: Positive for weakness (generalized)  Negative for dizziness, speech difficulty, light-headedness, numbness and headaches         "burning sensation" to the bilateral flanks and oral cavity  Psychiatric/Behavioral: Negative for agitation, behavioral problems and confusion  Physical Exam  Physical Exam   Constitutional: She is oriented to person, place, and time  She appears well-developed and well-nourished  No distress  HENT:   Head: Normocephalic and atraumatic  Right Ear: External ear normal    Left Ear: External ear normal    Nose: Nose normal    Mouth/Throat: Oropharynx is clear and moist    Eyes: Pupils are equal, round, and reactive to light  Conjunctivae and EOM are normal    No papilledema on fundoscopy   Neck: Normal range of motion  Neck supple  Cardiovascular: Normal rate, regular rhythm, normal heart sounds and intact distal pulses  Exam reveals no gallop and no friction rub  No murmur heard  Pulmonary/Chest: Effort normal and breath sounds normal  No respiratory distress  She has no wheezes  She has no rales  She exhibits no tenderness  Abdominal: Soft  Bowel sounds are normal  She exhibits no distension  There is no tenderness  There is no guarding  Musculoskeletal: Normal range of motion  She exhibits no edema or deformity  Neurological: She is alert and oriented to person, place, and time  She exhibits normal muscle tone     Face symmetric, tongue midline, 5/5 strength in the proximal and distal upper and lower extremities bilaterally with intact sensation to light touch throughout  CN II-XII intact  Normal finger-to-nose, rapid alternating movements, and heel-to-shin bilaterally  Normal speech, normal gait  No pronator drift  Skin: Skin is warm and dry  She is not diaphoretic  Vital Signs  ED Triage Vitals [11/02/18 1820]   Temperature Pulse Respirations Blood Pressure SpO2   98 4 °F (36 9 °C) 75 18 148/80 100 %      Temp Source Heart Rate Source Patient Position - Orthostatic VS BP Location FiO2 (%)   Oral Monitor Lying Right arm --      Pain Score       Worst Possible Pain           Vitals:    11/02/18 1820 11/02/18 2030   BP: 148/80 151/69   Pulse: 75 77   Patient Position - Orthostatic VS: Lying Lying       Visual Acuity      ED Medications  Medications - No data to display    Diagnostic Studies  Results Reviewed     Procedure Component Value Units Date/Time    TSH [[id number]]  (Normal) Collected:  11/02/18 1900    Lab Status:  Final result Specimen:  Blood from Arm, Right Updated:  11/02/18 1936     TSH 3RD GENERATON 2 278 uIU/mL     Narrative:         Patients undergoing fluorescein dye angiography may retain small amounts of fluorescein in the body for 48-72 hours post procedure  Samples containing fluorescein can produce falsely depressed TSH values  If the patient had this procedure,a specimen should be resubmitted post fluorescein clearance            The recommended reference ranges for TSH during pregnancy are as follows:  First trimester 0 1 to 2 5 uIU/mL  Second trimester  0 2 to 3 0 uIU/mL  Third trimester 0 3 to 3 0 uIU/m      Troponin I [[id number]]  (Normal) Collected:  11/02/18 1900    Lab Status:  Final result Specimen:  Blood from Arm, Right Updated:  11/02/18 1929     Troponin I <0 02 ng/mL     Comprehensive metabolic panel [[id number]] Collected:  11/02/18 1900    Lab Status:  Final result Specimen:  Blood from Arm, Right Updated:  11/02/18 1927     Sodium 141 mmol/L      Potassium 4 5 mmol/L Chloride 104 mmol/L      CO2 30 mmol/L      ANION GAP 7 mmol/L      BUN 12 mg/dL      Creatinine 0 78 mg/dL      Glucose 77 mg/dL      Calcium 9 4 mg/dL      AST 22 U/L      ALT 37 U/L      Alkaline Phosphatase 92 U/L      Total Protein 7 5 g/dL      Albumin 3 7 g/dL      Total Bilirubin 0 50 mg/dL      eGFR 85 ml/min/1 73sq m     Narrative:         National Kidney Disease Education Program recommendations are as follows:  GFR calculation is accurate only with a steady state creatinine  Chronic Kidney disease less than 60 ml/min/1 73 sq  meters  Kidney failure less than 15 ml/min/1 73 sq  meters  CBC and differential [[id number]]  (Abnormal) Collected:  11/02/18 1900    Lab Status:  Final result Specimen:  Blood from Arm, Right Updated:  11/02/18 1906     WBC 9 02 Thousand/uL      RBC 5 13 (H) Million/uL      Hemoglobin 15 1 g/dL      Hematocrit 44 7 %      MCV 87 fL      MCH 29 4 pg      MCHC 33 8 g/dL      RDW 13 0 %      MPV 11 6 fL      Platelets 132 Thousands/uL      nRBC 0 /100 WBCs      Neutrophils Relative 53 %      Immat GRANS % 0 %      Lymphocytes Relative 35 %      Monocytes Relative 9 %      Eosinophils Relative 2 %      Basophils Relative 1 %      Neutrophils Absolute 4 78 Thousands/µL      Immature Grans Absolute 0 03 Thousand/uL      Lymphocytes Absolute 3 11 Thousands/µL      Monocytes Absolute 0 84 Thousand/µL      Eosinophils Absolute 0 21 Thousand/µL      Basophils Absolute 0 05 Thousands/µL                  No orders to display              Procedures  Procedures       Phone Contacts  ED Phone Contact    ED Course                               MDM  Number of Diagnoses or Management Options  Fatigue:   Lyme disease:   Myalgia:   Diagnosis management comments: Generally well appearing  Afebrile and hemodynamically stable  Neuro exam unremarkable as above  Doubt stroke or TIA    Patient does not have papilledema on funduscopic exam, her extraocular movements are intact, pupils briskly reactive, and she denies blurry vision currently  Doubt optic neuritis, glaucoma, or acute ocular pathology  She endorses intermittent confusion, but is alert and oriented x4 here and nontoxic-appearing  I have a low suspicion for meningitis or encephalitis, and given nonfocal neuro exam there is no indication for emergent neuro imaging or LP  I personally interpreted the patient's EKG, which shows normal rate, normal sinus rhythm, normal axis, prolonged QTc to 473 unchanged from prior, otherwise normal intervals without evidence of blocks, T-wave inversions in 1 and aVL unchanged from prior, T-wave inversions in leads V4 through V6 unchanged from prior, no new ischemic changes, unchanged from most recent prior EKG on February 18, 2018  Patient denies chest pain or shortness of breath  Single troponin obtained given fatigue and generalized weakness that is undetectable  Symptoms have been present for greater than 3 hours, so no indication for repeat troponin  CBC with no leukocytosis and patient denies acute infectious symptoms  No evidence of anemia  CMP with normal renal function and normal electrolytes  TSH is within normal limits making thyroid dysfunction unlikely  I do not see any physical findings on exam that would explain the patient's burning sensation in her bilateral flanks, specifically no rashes, there is no midline back tenderness  Her oropharynx is clear without lesions that would explain the burning sensation in her mouth  Both of these findings are chronic  Abdominal exam is benign, and patient's diarrhea is chronic  No recent antibiotic use  No indication for C diff testing  She appears well hydrated  I had an extensive conversation with the patient at bedside regarding the fact that nearly all of her symptoms could be the result of chronic Lyme disease  She has an appointment with a Lyme disease specialist in 1 month    I reviewed the patient's outside laboratory tests for Lyme disease, which showed positive IgG negative IgM, suggesting chronic disease  Patient also had a positive LEXI, and was told years ago that she might have lupus, but never followed up with a rheumatologist   I have also recommended follow up with a rheumatologist for further evaluation  In the meantime, will prescribe Naprosyn for generalized myalgias  I have a low suspicion for acute infectious, cardiac, or neurologic pathology as the cause of her symptoms  We discussed follow up with her PCP and return to the emergency department for new or concerning symptoms  Amount and/or Complexity of Data Reviewed  Clinical lab tests: ordered and reviewed  Review and summarize past medical records: yes  Independent visualization of images, tracings, or specimens: yes    Patient Progress  Patient progress: stable    CritCare Time         Disposition  Final diagnoses:   Myalgia   Fatigue   Lyme disease     Time reflects when diagnosis was documented in both MDM as applicable and the Disposition within this note     Time User Action Codes Description Comment    11/2/2018  8:15 PM Nelwyn Galeazzi Add [U54 88] Myalgia     11/2/2018  8:15 PM Nelwyn Galeazzi Add [R53 83] Fatigue     11/2/2018  8:15 PM Nelwyn Galeazzi Add [A69 20] Lyme disease       ED Disposition     ED Disposition Condition Comment    Discharge  [facility] discharge to home/self care  Condition at discharge: Good        Follow-up Information     Follow up With Specialties Details Why Contact Info Additional [address] [facility]  For further management of your symptoms    [facility] Emergency Department Emergency Medicine  As needed, If symptoms worsen [address] [facility] [address]  [phone] [place] ED, [address], [place], [address]          Discharge Medication List as of 11/2/2018  8:21 PM      START taking these medications    Details   naproxen (NAPROSYN) 500 mg tablet Take 1 tablet (500 mg total) by mouth 2 (two) times a day with meals for 14 days, Starting Fri 11/2/2018, Until Fri 11/16/2018, Print         CONTINUE these medications which have NOT CHANGED    Details   AMLODIPINE BESYLATE PO Take by mouth daily, Historical Med      escitalopram (LEXAPRO) 10 mg tablet Take 10 mg by mouth daily, Historical Med      loratadine (CLARITIN) 10 mg tablet Take 1 tablet by mouth daily, Starting Mon 11/13/2017, Print      metFORMIN (GLUCOPHAGE) 500 mg tablet Take 500 mg by mouth 2 (two) times a day with meals, Historical Med      PROAIR  (90 Base) MCG/ACT inhaler Inhale 2 puffs every 4 (four) hours as needed for wheezing, Starting Fri 4/20/2018, Historical Med           No discharge procedures on file      ED Provider  Electronically Signed by           Em Meier MD  11/02/18 2037

## 2018-11-03 LAB
ATRIAL RATE: 72 BPM
P AXIS: 64 DEGREES
PR INTERVAL: 154 MS
QRS AXIS: 36 DEGREES
QRSD INTERVAL: 86 MS
QT INTERVAL: 432 MS
QTC INTERVAL: 473 MS
T WAVE AXIS: 159 DEGREES
VENTRICULAR RATE: 72 BPM

## 2018-11-03 PROCEDURE — 93010 ELECTROCARDIOGRAM REPORT: CPT | Performed by: INTERNAL MEDICINE

## 2018-11-03 NOTE — DISCHARGE INSTRUCTIONS
Fatigue   WHAT YOU NEED TO KNOW:   Fatigue is mental and physical exhaustion that does not get better with rest  Fatigue may make daily activities difficult or cause extreme sleepiness  It is normal to feel tired sometimes, but long-term fatigue may be a sign of serious illness  DISCHARGE INSTRUCTIONS:   Seek care immediately if:   · You have chest pain  · You have difficulty breathing  Contact your healthcare provider if:   · You have a cough that gets worse, or does not go away  · You see blood in your urine or bowel movement  · You have numbness or tingling around your mouth or in an arm or leg  · You faint, feel dizzy, or have vision changes  · You have swelling in your lymph nodes  · You are a woman and have vaginal bleeding that is not normal for you, or is not expected  · You lose weight without trying, or you have trouble eating  · You feel weak or have muscle pain  · You have pain or swelling in your joints  · You have questions or concerns about your condition or care  Follow up with your healthcare provider as directed: You may need more tests  Your healthcare provider may also refer you to a specialist  Write down your questions so you remember to ask them during your visits  Manage fatigue:   · Keep a fatigue diary  Include anything that makes you feel more tired or less tired  Bring the diary with you to follow-up visits with your provider  · Exercise as directed  Exercise can help you feel more alert  Exercise can also help you manage stress or relieve depression  Try to get at least 30 minutes of exercise most days of the week  · Keep a regular sleep schedule  Go to bed and wake up at the same times every day  Limit naps to 1 hour each day  A nap can improve fatigue, but a long nap may make it harder to go to sleep at night  · Plan and limit your activities  Limit the number of activities such as shopping and cleaning you do each day   If possible, try to spread out your trips throughout the week  Plan ahead so you are not rushing to get something done  Only do activities that you have the energy to complete  Take breaks between activities  Ask for help if you need it  Another person may be able to drive you or help with daily activities  · Eat a variety of healthy foods  Healthy foods include fruits, vegetables, whole-grain breads, low-fat dairy products, beans, lean meats, and fish  Good nutrition can help manage fatigue  · Limit caffeine and alcohol  These can make it difficult to fall or stay asleep  Women should limit alcohol to 1 drink a day  Men should limit alcohol to 2 drinks a day  A drink of alcohol is 12 ounces of beer, 5 ounces of wine, or 1½ ounces of liquor  Ask our healthcare provider how much caffeine is safe for you  · Do not smoke  Nicotine and other chemicals in cigarettes and cigars can cause lung damage and increase fatigue  Ask your healthcare provider for information if you currently smoke and need help to quit  E-cigarettes or smokeless tobacco still contain nicotine  Talk to your healthcare provider before you use these products  © 2017 [facility] Information is for End User's use only and may not be sold, redistributed or otherwise used for commercial purposes  All illustrations and images included in CareNotes® are the copyrighted property of [employer]  or Peter Whittington  The above information is an  only  It is not intended as medical advice for individual conditions or treatments  Talk to your doctor, nurse or pharmacist before following any medical regimen to see if it is safe and effective for you

## 2019-04-11 ENCOUNTER — APPOINTMENT (EMERGENCY)
Dept: RADIOLOGY | Facility: HOSPITAL | Age: 59
DRG: 882 | End: 2019-04-11
Payer: COMMERCIAL

## 2019-04-11 ENCOUNTER — APPOINTMENT (EMERGENCY)
Dept: CT IMAGING | Facility: HOSPITAL | Age: 59
DRG: 882 | End: 2019-04-11
Payer: COMMERCIAL

## 2019-04-11 ENCOUNTER — APPOINTMENT (OUTPATIENT)
Dept: CT IMAGING | Facility: HOSPITAL | Age: 59
DRG: 882 | End: 2019-04-11
Payer: COMMERCIAL

## 2019-04-11 ENCOUNTER — HOSPITAL ENCOUNTER (INPATIENT)
Facility: HOSPITAL | Age: 59
LOS: 1 days | Discharge: HOME | DRG: 882 | End: 2019-04-13
Attending: EMERGENCY MEDICINE | Admitting: STUDENT IN AN ORGANIZED HEALTH CARE EDUCATION/TRAINING PROGRAM
Payer: COMMERCIAL

## 2019-04-11 DIAGNOSIS — C44.99 DERMATOFIBROSARCOMA: ICD-10-CM

## 2019-04-11 DIAGNOSIS — F41.9 ANXIETY: ICD-10-CM

## 2019-04-11 DIAGNOSIS — R94.31 EKG ABNORMALITIES: ICD-10-CM

## 2019-04-11 DIAGNOSIS — M54.9 UPPER BACK PAIN: ICD-10-CM

## 2019-04-11 DIAGNOSIS — R20.0 RIGHT ARM NUMBNESS: ICD-10-CM

## 2019-04-11 DIAGNOSIS — R20.0 FACIAL NUMBNESS: ICD-10-CM

## 2019-04-11 DIAGNOSIS — R25.1 TREMORS OF NERVOUS SYSTEM: ICD-10-CM

## 2019-04-11 DIAGNOSIS — G25.0 ESSENTIAL TREMOR: Primary | ICD-10-CM

## 2019-04-11 DIAGNOSIS — I72.0 CAROTID ARTERY ANEURYSM (HCC): ICD-10-CM

## 2019-04-11 LAB
ALBUMIN SERPL BCP-MCNC: 4.3 G/DL (ref 3.5–5)
ALP SERPL-CCNC: 99 U/L (ref 46–116)
ALT SERPL W P-5'-P-CCNC: 45 U/L (ref 12–78)
ANION GAP SERPL CALCULATED.3IONS-SCNC: 8 MMOL/L (ref 4–13)
APTT PPP: 33 SECONDS (ref 26–38)
AST SERPL W P-5'-P-CCNC: 24 U/L (ref 5–45)
BASOPHILS # BLD AUTO: 0.05 THOUSANDS/ΜL (ref 0–0.1)
BASOPHILS NFR BLD AUTO: 1 % (ref 0–1)
BILIRUB DIRECT SERPL-MCNC: 0.1 MG/DL (ref 0–0.2)
BILIRUB SERPL-MCNC: 0.4 MG/DL (ref 0.2–1)
BILIRUB UR QL STRIP: NEGATIVE
BUN SERPL-MCNC: 12 MG/DL (ref 5–25)
CALCIUM SERPL-MCNC: 9.8 MG/DL (ref 8.3–10.1)
CHLORIDE SERPL-SCNC: 103 MMOL/L (ref 100–108)
CLARITY UR: CLEAR
CO2 SERPL-SCNC: 29 MMOL/L (ref 21–32)
COLOR UR: NORMAL
CREAT SERPL-MCNC: 0.83 MG/DL (ref 0.6–1.3)
CRP SERPL HS-MCNC: 3.13 MG/L
EOSINOPHIL # BLD AUTO: 0.2 THOUSAND/ΜL (ref 0–0.61)
EOSINOPHIL NFR BLD AUTO: 2 % (ref 0–6)
ERYTHROCYTE [DISTWIDTH] IN BLOOD BY AUTOMATED COUNT: 13.2 % (ref 11.6–15.1)
ERYTHROCYTE [SEDIMENTATION RATE] IN BLOOD: 9 MM/HOUR (ref 0–20)
GFR SERPL CREATININE-BSD FRML MDRD: 79 ML/MIN/1.73SQ M
GLUCOSE SERPL-MCNC: 108 MG/DL (ref 65–140)
GLUCOSE UR STRIP-MCNC: NEGATIVE MG/DL
HCT VFR BLD AUTO: 44.9 % (ref 34.8–46.1)
HGB BLD-MCNC: 14.9 G/DL (ref 11.5–15.4)
HGB UR QL STRIP.AUTO: NEGATIVE
IMM GRANULOCYTES # BLD AUTO: 0.02 THOUSAND/UL (ref 0–0.2)
IMM GRANULOCYTES NFR BLD AUTO: 0 % (ref 0–2)
INR PPP: 0.97 (ref 0.86–1.17)
KETONES UR STRIP-MCNC: NEGATIVE MG/DL
LACTATE SERPL-SCNC: 1.7 MMOL/L (ref 0.5–2)
LEUKOCYTE ESTERASE UR QL STRIP: NEGATIVE
LYMPHOCYTES # BLD AUTO: 3.1 THOUSANDS/ΜL (ref 0.6–4.47)
LYMPHOCYTES NFR BLD AUTO: 37 % (ref 14–44)
MAGNESIUM SERPL-MCNC: 2.2 MG/DL (ref 1.6–2.6)
MCH RBC QN AUTO: 28.3 PG (ref 26.8–34.3)
MCHC RBC AUTO-ENTMCNC: 33.2 G/DL (ref 31.4–37.4)
MCV RBC AUTO: 85 FL (ref 82–98)
MONOCYTES # BLD AUTO: 0.69 THOUSAND/ΜL (ref 0.17–1.22)
MONOCYTES NFR BLD AUTO: 8 % (ref 4–12)
NEUTROPHILS # BLD AUTO: 4.28 THOUSANDS/ΜL (ref 1.85–7.62)
NEUTS SEG NFR BLD AUTO: 52 % (ref 43–75)
NITRITE UR QL STRIP: NEGATIVE
NRBC BLD AUTO-RTO: 0 /100 WBCS
PH UR STRIP.AUTO: 7 [PH]
PLATELET # BLD AUTO: 206 THOUSANDS/UL (ref 149–390)
PMV BLD AUTO: 11.8 FL (ref 8.9–12.7)
POTASSIUM SERPL-SCNC: 3.9 MMOL/L (ref 3.5–5.3)
PROT SERPL-MCNC: 8.3 G/DL (ref 6.4–8.2)
PROT UR STRIP-MCNC: NEGATIVE MG/DL
PROTHROMBIN TIME: 12.8 SECONDS (ref 11.8–14.2)
RBC # BLD AUTO: 5.26 MILLION/UL (ref 3.81–5.12)
SODIUM SERPL-SCNC: 140 MMOL/L (ref 136–145)
SP GR UR STRIP.AUTO: <=1.005 (ref 1–1.03)
TROPONIN I SERPL-MCNC: <0.02 NG/ML
TROPONIN I SERPL-MCNC: <0.02 NG/ML
TSH SERPL DL<=0.05 MIU/L-ACNC: 2.54 UIU/ML (ref 0.36–3.74)
UROBILINOGEN UR QL STRIP.AUTO: 0.2 E.U./DL
WBC # BLD AUTO: 8.34 THOUSAND/UL (ref 4.31–10.16)

## 2019-04-11 PROCEDURE — 83735 ASSAY OF MAGNESIUM: CPT | Performed by: EMERGENCY MEDICINE

## 2019-04-11 PROCEDURE — 80048 BASIC METABOLIC PNL TOTAL CA: CPT | Performed by: EMERGENCY MEDICINE

## 2019-04-11 PROCEDURE — 84443 ASSAY THYROID STIM HORMONE: CPT | Performed by: EMERGENCY MEDICINE

## 2019-04-11 PROCEDURE — 84484 ASSAY OF TROPONIN QUANT: CPT | Performed by: HOSPITALIST

## 2019-04-11 PROCEDURE — 84484 ASSAY OF TROPONIN QUANT: CPT | Performed by: EMERGENCY MEDICINE

## 2019-04-11 PROCEDURE — 86039 ANTINUCLEAR ANTIBODIES (ANA): CPT | Performed by: EMERGENCY MEDICINE

## 2019-04-11 PROCEDURE — 80076 HEPATIC FUNCTION PANEL: CPT | Performed by: EMERGENCY MEDICINE

## 2019-04-11 PROCEDURE — 86038 ANTINUCLEAR ANTIBODIES: CPT | Performed by: EMERGENCY MEDICINE

## 2019-04-11 PROCEDURE — 70498 CT ANGIOGRAPHY NECK: CPT

## 2019-04-11 PROCEDURE — 86141 C-REACTIVE PROTEIN HS: CPT | Performed by: EMERGENCY MEDICINE

## 2019-04-11 PROCEDURE — 99285 EMERGENCY DEPT VISIT HI MDM: CPT | Performed by: EMERGENCY MEDICINE

## 2019-04-11 PROCEDURE — 85730 THROMBOPLASTIN TIME PARTIAL: CPT | Performed by: EMERGENCY MEDICINE

## 2019-04-11 PROCEDURE — 85732 THROMBOPLASTIN TIME PARTIAL: CPT | Performed by: EMERGENCY MEDICINE

## 2019-04-11 PROCEDURE — 81003 URINALYSIS AUTO W/O SCOPE: CPT | Performed by: HOSPITALIST

## 2019-04-11 PROCEDURE — 99285 EMERGENCY DEPT VISIT HI MDM: CPT

## 2019-04-11 PROCEDURE — 86617 LYME DISEASE ANTIBODY: CPT | Performed by: EMERGENCY MEDICINE

## 2019-04-11 PROCEDURE — 70496 CT ANGIOGRAPHY HEAD: CPT

## 2019-04-11 PROCEDURE — 85705 THROMBOPLASTIN INHIBITION: CPT | Performed by: EMERGENCY MEDICINE

## 2019-04-11 PROCEDURE — 36415 COLL VENOUS BLD VENIPUNCTURE: CPT | Performed by: EMERGENCY MEDICINE

## 2019-04-11 PROCEDURE — 99220 PR INITIAL OBSERVATION CARE/DAY 70 MINUTES: CPT | Performed by: HOSPITALIST

## 2019-04-11 PROCEDURE — 85610 PROTHROMBIN TIME: CPT | Performed by: EMERGENCY MEDICINE

## 2019-04-11 PROCEDURE — 85025 COMPLETE CBC W/AUTO DIFF WBC: CPT | Performed by: EMERGENCY MEDICINE

## 2019-04-11 PROCEDURE — 83605 ASSAY OF LACTIC ACID: CPT | Performed by: EMERGENCY MEDICINE

## 2019-04-11 PROCEDURE — 71046 X-RAY EXAM CHEST 2 VIEWS: CPT

## 2019-04-11 PROCEDURE — 96360 HYDRATION IV INFUSION INIT: CPT

## 2019-04-11 PROCEDURE — 93005 ELECTROCARDIOGRAM TRACING: CPT

## 2019-04-11 PROCEDURE — 85613 RUSSELL VIPER VENOM DILUTED: CPT | Performed by: EMERGENCY MEDICINE

## 2019-04-11 PROCEDURE — 85652 RBC SED RATE AUTOMATED: CPT | Performed by: EMERGENCY MEDICINE

## 2019-04-11 PROCEDURE — 85670 THROMBIN TIME PLASMA: CPT | Performed by: EMERGENCY MEDICINE

## 2019-04-11 RX ORDER — AMLODIPINE BESYLATE 2.5 MG/1
2.5 TABLET ORAL DAILY
Status: DISCONTINUED | OUTPATIENT
Start: 2019-04-12 | End: 2019-04-11

## 2019-04-11 RX ORDER — NAPROXEN 250 MG/1
500 TABLET ORAL 2 TIMES DAILY WITH MEALS
Status: DISCONTINUED | OUTPATIENT
Start: 2019-04-12 | End: 2019-04-13 | Stop reason: HOSPADM

## 2019-04-11 RX ORDER — HYDRALAZINE HYDROCHLORIDE 20 MG/ML
10 INJECTION INTRAMUSCULAR; INTRAVENOUS EVERY 6 HOURS PRN
Status: DISCONTINUED | OUTPATIENT
Start: 2019-04-11 | End: 2019-04-13 | Stop reason: HOSPADM

## 2019-04-11 RX ORDER — ASPIRIN 81 MG/1
81 TABLET, CHEWABLE ORAL DAILY
Status: DISCONTINUED | OUTPATIENT
Start: 2019-04-12 | End: 2019-04-13 | Stop reason: HOSPADM

## 2019-04-11 RX ORDER — AMLODIPINE BESYLATE 5 MG/1
5 TABLET ORAL DAILY
Status: DISCONTINUED | OUTPATIENT
Start: 2019-04-11 | End: 2019-04-13 | Stop reason: HOSPADM

## 2019-04-11 RX ORDER — LORATADINE 10 MG/1
10 TABLET ORAL DAILY
Status: DISCONTINUED | OUTPATIENT
Start: 2019-04-12 | End: 2019-04-13 | Stop reason: HOSPADM

## 2019-04-11 RX ORDER — NICOTINE 21 MG/24HR
1 PATCH, TRANSDERMAL 24 HOURS TRANSDERMAL DAILY
Status: DISCONTINUED | OUTPATIENT
Start: 2019-04-12 | End: 2019-04-13 | Stop reason: HOSPADM

## 2019-04-11 RX ORDER — ACETAMINOPHEN 325 MG/1
650 TABLET ORAL EVERY 6 HOURS PRN
Status: DISCONTINUED | OUTPATIENT
Start: 2019-04-11 | End: 2019-04-13 | Stop reason: HOSPADM

## 2019-04-11 RX ORDER — ESCITALOPRAM OXALATE 10 MG/1
10 TABLET ORAL DAILY
Status: DISCONTINUED | OUTPATIENT
Start: 2019-04-12 | End: 2019-04-13 | Stop reason: HOSPADM

## 2019-04-11 RX ADMIN — SODIUM CHLORIDE 1000 ML: 0.9 INJECTION, SOLUTION INTRAVENOUS at 19:44

## 2019-04-11 RX ADMIN — IOHEXOL 85 ML: 350 INJECTION, SOLUTION INTRAVENOUS at 20:26

## 2019-04-11 RX ADMIN — AMLODIPINE BESYLATE 5 MG: 5 TABLET ORAL at 23:19

## 2019-04-12 ENCOUNTER — APPOINTMENT (INPATIENT)
Dept: MRI IMAGING | Facility: HOSPITAL | Age: 59
DRG: 882 | End: 2019-04-12
Payer: COMMERCIAL

## 2019-04-12 PROBLEM — I72.0 CAROTID ARTERY ANEURYSM (HCC): Status: ACTIVE | Noted: 2019-04-12

## 2019-04-12 LAB
ANA HOMOGEN SER QL IF: NORMAL
ANA HOMOGEN TITR SER: NORMAL {TITER}
ATRIAL RATE: 75 BPM
ATRIAL RATE: 80 BPM
CHOLEST SERPL-MCNC: 215 MG/DL (ref 50–200)
EST. AVERAGE GLUCOSE BLD GHB EST-MCNC: 126 MG/DL
GLUCOSE SERPL-MCNC: 102 MG/DL (ref 65–140)
GLUCOSE SERPL-MCNC: 131 MG/DL (ref 65–140)
GLUCOSE SERPL-MCNC: 135 MG/DL (ref 65–140)
GLUCOSE SERPL-MCNC: 183 MG/DL (ref 65–140)
HBA1C MFR BLD: 6 % (ref 4.2–6.3)
HDLC SERPL-MCNC: 40 MG/DL (ref 40–60)
LDLC SERPL CALC-MCNC: 142 MG/DL (ref 0–100)
NONHDLC SERPL-MCNC: 175 MG/DL
P AXIS: 58 DEGREES
P AXIS: 62 DEGREES
PR INTERVAL: 156 MS
PR INTERVAL: 158 MS
QRS AXIS: 29 DEGREES
QRS AXIS: 34 DEGREES
QRSD INTERVAL: 82 MS
QRSD INTERVAL: 84 MS
QT INTERVAL: 434 MS
QT INTERVAL: 440 MS
QTC INTERVAL: 491 MS
QTC INTERVAL: 500 MS
RYE IGE QN: POSITIVE
T WAVE AXIS: 167 DEGREES
T WAVE AXIS: 170 DEGREES
TRIGL SERPL-MCNC: 166 MG/DL
TROPONIN I SERPL-MCNC: 0.02 NG/ML
VENTRICULAR RATE: 75 BPM
VENTRICULAR RATE: 80 BPM

## 2019-04-12 PROCEDURE — 99222 1ST HOSP IP/OBS MODERATE 55: CPT | Performed by: PHYSICIAN ASSISTANT

## 2019-04-12 PROCEDURE — 99232 SBSQ HOSP IP/OBS MODERATE 35: CPT | Performed by: STUDENT IN AN ORGANIZED HEALTH CARE EDUCATION/TRAINING PROGRAM

## 2019-04-12 PROCEDURE — G8989 SELF CARE D/C STATUS: HCPCS

## 2019-04-12 PROCEDURE — 84484 ASSAY OF TROPONIN QUANT: CPT | Performed by: HOSPITALIST

## 2019-04-12 PROCEDURE — G8988 SELF CARE GOAL STATUS: HCPCS

## 2019-04-12 PROCEDURE — G8979 MOBILITY GOAL STATUS: HCPCS

## 2019-04-12 PROCEDURE — G8987 SELF CARE CURRENT STATUS: HCPCS

## 2019-04-12 PROCEDURE — G8978 MOBILITY CURRENT STATUS: HCPCS

## 2019-04-12 PROCEDURE — 36415 COLL VENOUS BLD VENIPUNCTURE: CPT | Performed by: HOSPITALIST

## 2019-04-12 PROCEDURE — 83036 HEMOGLOBIN GLYCOSYLATED A1C: CPT | Performed by: HOSPITALIST

## 2019-04-12 PROCEDURE — 80061 LIPID PANEL: CPT | Performed by: HOSPITALIST

## 2019-04-12 PROCEDURE — 97163 PT EVAL HIGH COMPLEX 45 MIN: CPT

## 2019-04-12 PROCEDURE — 93005 ELECTROCARDIOGRAM TRACING: CPT

## 2019-04-12 PROCEDURE — 70551 MRI BRAIN STEM W/O DYE: CPT

## 2019-04-12 PROCEDURE — 93010 ELECTROCARDIOGRAM REPORT: CPT | Performed by: INTERNAL MEDICINE

## 2019-04-12 PROCEDURE — 99254 IP/OBS CNSLTJ NEW/EST MOD 60: CPT | Performed by: PSYCHIATRY & NEUROLOGY

## 2019-04-12 PROCEDURE — 82948 REAGENT STRIP/BLOOD GLUCOSE: CPT

## 2019-04-12 PROCEDURE — 97165 OT EVAL LOW COMPLEX 30 MIN: CPT

## 2019-04-12 PROCEDURE — G8980 MOBILITY D/C STATUS: HCPCS

## 2019-04-12 RX ORDER — ATORVASTATIN CALCIUM 40 MG/1
40 TABLET, FILM COATED ORAL
Status: DISCONTINUED | OUTPATIENT
Start: 2019-04-12 | End: 2019-04-13 | Stop reason: HOSPADM

## 2019-04-12 RX ORDER — LOPERAMIDE HYDROCHLORIDE 2 MG/1
2 CAPSULE ORAL AS NEEDED
COMMUNITY

## 2019-04-12 RX ORDER — LORAZEPAM 2 MG/ML
1 INJECTION INTRAMUSCULAR ONCE
Status: COMPLETED | OUTPATIENT
Start: 2019-04-12 | End: 2019-04-12

## 2019-04-12 RX ADMIN — ASPIRIN 81 MG 81 MG: 81 TABLET ORAL at 08:25

## 2019-04-12 RX ADMIN — ESCITALOPRAM OXALATE 10 MG: 10 TABLET ORAL at 08:25

## 2019-04-12 RX ADMIN — LORAZEPAM 1 MG: 2 INJECTION INTRAMUSCULAR; INTRAVENOUS at 18:20

## 2019-04-12 RX ADMIN — LORATADINE 10 MG: 10 TABLET ORAL at 08:25

## 2019-04-12 RX ADMIN — AMLODIPINE BESYLATE 5 MG: 5 TABLET ORAL at 08:25

## 2019-04-12 RX ADMIN — ATORVASTATIN CALCIUM 40 MG: 40 TABLET, FILM COATED ORAL at 17:03

## 2019-04-12 RX ADMIN — NAPROXEN 500 MG: 250 TABLET ORAL at 17:03

## 2019-04-12 RX ADMIN — INSULIN LISPRO 1 UNITS: 100 INJECTION, SOLUTION INTRAVENOUS; SUBCUTANEOUS at 12:30

## 2019-04-13 VITALS
HEIGHT: 64 IN | OXYGEN SATURATION: 96 % | HEART RATE: 73 BPM | BODY MASS INDEX: 34.29 KG/M2 | RESPIRATION RATE: 16 BRPM | DIASTOLIC BLOOD PRESSURE: 64 MMHG | TEMPERATURE: 98.2 F | WEIGHT: 200.84 LBS | SYSTOLIC BLOOD PRESSURE: 137 MMHG

## 2019-04-13 PROBLEM — F41.9 ANXIETY: Status: ACTIVE | Noted: 2019-04-13

## 2019-04-13 LAB
GLUCOSE SERPL-MCNC: 121 MG/DL (ref 65–140)
GLUCOSE SERPL-MCNC: 195 MG/DL (ref 65–140)

## 2019-04-13 PROCEDURE — 99232 SBSQ HOSP IP/OBS MODERATE 35: CPT | Performed by: PSYCHIATRY & NEUROLOGY

## 2019-04-13 PROCEDURE — 99239 HOSP IP/OBS DSCHRG MGMT >30: CPT | Performed by: STUDENT IN AN ORGANIZED HEALTH CARE EDUCATION/TRAINING PROGRAM

## 2019-04-13 PROCEDURE — 82948 REAGENT STRIP/BLOOD GLUCOSE: CPT

## 2019-04-13 RX ORDER — ATORVASTATIN CALCIUM 40 MG/1
40 TABLET, FILM COATED ORAL
Qty: 30 TABLET | Refills: 0 | Status: SHIPPED | OUTPATIENT
Start: 2019-04-13

## 2019-04-13 RX ORDER — ASPIRIN 81 MG/1
81 TABLET, CHEWABLE ORAL DAILY
Qty: 30 TABLET | Refills: 0 | Status: SHIPPED | OUTPATIENT
Start: 2019-04-14

## 2019-04-13 RX ADMIN — ESCITALOPRAM OXALATE 10 MG: 10 TABLET ORAL at 09:34

## 2019-04-13 RX ADMIN — LORATADINE 10 MG: 10 TABLET ORAL at 09:34

## 2019-04-13 RX ADMIN — NAPROXEN 500 MG: 250 TABLET ORAL at 06:34

## 2019-04-13 RX ADMIN — AMLODIPINE BESYLATE 5 MG: 5 TABLET ORAL at 09:34

## 2019-04-13 RX ADMIN — ASPIRIN 81 MG 81 MG: 81 TABLET ORAL at 09:34

## 2019-04-16 ENCOUNTER — TELEPHONE (OUTPATIENT)
Dept: NEUROLOGY | Facility: CLINIC | Age: 59
End: 2019-04-16

## 2019-04-16 LAB
APTT SCREEN TO CONFIRM RATIO: 0.84 RATIO (ref 0–1.4)
B BURGDOR IGG PATRN SER IB-IMP: NEGATIVE
B BURGDOR IGM PATRN SER IB-IMP: NEGATIVE
B BURGDOR18KD IGG SER QL IB: ABNORMAL
B BURGDOR23KD IGG SER QL IB: ABNORMAL
B BURGDOR23KD IGM SER QL IB: PRESENT
B BURGDOR28KD IGG SER QL IB: ABNORMAL
B BURGDOR30KD IGG SER QL IB: ABNORMAL
B BURGDOR39KD IGG SER QL IB: ABNORMAL
B BURGDOR39KD IGM SER QL IB: ABNORMAL
B BURGDOR41KD IGG SER QL IB: ABNORMAL
B BURGDOR41KD IGM SER QL IB: ABNORMAL
B BURGDOR45KD IGG SER QL IB: ABNORMAL
B BURGDOR58KD IGG SER QL IB: ABNORMAL
B BURGDOR66KD IGG SER QL IB: ABNORMAL
B BURGDOR93KD IGG SER QL IB: ABNORMAL
CONFIRM APTT/NORMAL: 44.9 SEC (ref 0–55)
LA PPP-IMP: NORMAL
SCREEN APTT: 46.1 SEC (ref 0–51.9)
SCREEN DRVVT: 39.1 SEC (ref 0–47)
THROMBIN TIME: 18.1 SEC (ref 0–23)

## 2019-05-10 ENCOUNTER — TRANSCRIBE ORDERS (OUTPATIENT)
Dept: NEUROLOGY | Facility: CLINIC | Age: 59
End: 2019-05-10

## 2019-05-10 DIAGNOSIS — G25.0 TREMOR, ESSENTIAL: Primary | ICD-10-CM

## 2019-05-13 ENCOUNTER — OFFICE VISIT (OUTPATIENT)
Dept: NEUROLOGY | Facility: CLINIC | Age: 59
End: 2019-05-13
Payer: COMMERCIAL

## 2019-05-13 VITALS
HEIGHT: 64 IN | DIASTOLIC BLOOD PRESSURE: 72 MMHG | BODY MASS INDEX: 33.84 KG/M2 | HEART RATE: 64 BPM | WEIGHT: 198.2 LBS | SYSTOLIC BLOOD PRESSURE: 112 MMHG

## 2019-05-13 DIAGNOSIS — G25.0 ESSENTIAL TREMOR: Primary | ICD-10-CM

## 2019-05-13 DIAGNOSIS — R20.0 NUMBNESS: ICD-10-CM

## 2019-05-13 PROCEDURE — 99215 OFFICE O/P EST HI 40 MIN: CPT | Performed by: PSYCHIATRY & NEUROLOGY

## 2019-05-13 RX ORDER — ASPIRIN 81 MG/1
81 TABLET, CHEWABLE ORAL DAILY
Refills: 0 | COMMUNITY
Start: 2019-04-13 | End: 2019-05-13 | Stop reason: SDUPTHER

## 2019-05-13 RX ORDER — GABAPENTIN 100 MG/1
100 CAPSULE ORAL
Qty: 30 CAPSULE | Refills: 5 | Status: SHIPPED | OUTPATIENT
Start: 2019-05-13 | End: 2020-01-06 | Stop reason: SDUPTHER

## 2019-05-13 RX ORDER — OMEGA-3-ACID ETHYL ESTERS 1 G/1
2 CAPSULE, LIQUID FILLED ORAL 2 TIMES DAILY
Refills: 3 | COMMUNITY
Start: 2019-03-22

## 2019-06-18 ENCOUNTER — PROCEDURE VISIT (OUTPATIENT)
Dept: NEUROLOGY | Facility: CLINIC | Age: 59
End: 2019-06-18
Payer: COMMERCIAL

## 2019-06-18 DIAGNOSIS — R20.0 NUMBNESS: ICD-10-CM

## 2019-06-18 PROCEDURE — 95886 MUSC TEST DONE W/N TEST COMP: CPT | Performed by: PHYSICAL MEDICINE & REHABILITATION

## 2019-06-18 PROCEDURE — 95911 NRV CNDJ TEST 9-10 STUDIES: CPT | Performed by: PHYSICAL MEDICINE & REHABILITATION

## 2019-06-19 ENCOUNTER — TELEPHONE (OUTPATIENT)
Dept: NEUROLOGY | Facility: CLINIC | Age: 59
End: 2019-06-19

## 2019-06-19 DIAGNOSIS — R25.1 TREMOR: Primary | ICD-10-CM

## 2019-08-05 ENCOUNTER — TELEPHONE (OUTPATIENT)
Dept: NEUROLOGY | Facility: CLINIC | Age: 59
End: 2019-08-05

## 2019-08-05 NOTE — TELEPHONE ENCOUNTER
pt called asking if she needs to see you tomorrow for follow up as she will also be seeing dr Kaylie Lieberman in [place]  please advise    [phone]

## 2019-10-11 ENCOUNTER — TELEPHONE (OUTPATIENT)
Dept: HEMATOLOGY ONCOLOGY | Facility: CLINIC | Age: 59
End: 2019-10-11

## 2019-10-11 NOTE — TELEPHONE ENCOUNTER
pt called concerned about some of her sx's  Burning, tingle, nausea & tremmors  Self   referring herself to our dept  She has a hx of dermatofibrosarcoma  I spoke to [facility] nurse to see if we would see her concerns  She should see a neurologist which she already has appointment for  I stated I would try to see if I can move her appointment up if I could  Per nurse Geovanna Salas, she states we can see her for the hx of dermatofibrosarcoma, we will need the bx  Report from 2017  Patient understands and is aware that we will try to do what we can  I spoke to Theodore at [facility] neurology and she is scheduled at the present moment for the first available appointment and is on a wait list   She states patient can call their office every so often as well to see if something has cancelled  I called patient back and she's aware

## 2019-10-25 ENCOUNTER — HOSPITAL ENCOUNTER (EMERGENCY)
Facility: HOSPITAL | Age: 59
Discharge: HOME | End: 2019-10-25
Attending: EMERGENCY MEDICINE | Admitting: EMERGENCY MEDICINE
Payer: COMMERCIAL

## 2019-10-25 VITALS
HEART RATE: 76 BPM | DIASTOLIC BLOOD PRESSURE: 63 MMHG | WEIGHT: 198.19 LBS | TEMPERATURE: 98.1 F | BODY MASS INDEX: 34.02 KG/M2 | OXYGEN SATURATION: 100 % | SYSTOLIC BLOOD PRESSURE: 145 MMHG | RESPIRATION RATE: 21 BRPM

## 2019-10-25 DIAGNOSIS — R20.2 PARESTHESIAS: Primary | ICD-10-CM

## 2019-10-25 LAB
ALBUMIN SERPL BCP-MCNC: 4 G/DL (ref 3.5–5)
ALP SERPL-CCNC: 84 U/L (ref 46–116)
ALT SERPL W P-5'-P-CCNC: 39 U/L (ref 12–78)
ANION GAP SERPL CALCULATED.3IONS-SCNC: 10 MMOL/L (ref 4–13)
AST SERPL W P-5'-P-CCNC: 29 U/L (ref 5–45)
ATRIAL RATE: 78 BPM
BASOPHILS # BLD AUTO: 0.03 THOUSANDS/ΜL (ref 0–0.1)
BASOPHILS NFR BLD AUTO: 0 % (ref 0–1)
BILIRUB SERPL-MCNC: 0.8 MG/DL (ref 0.2–1)
BILIRUB UR QL STRIP: NEGATIVE
BUN SERPL-MCNC: 11 MG/DL (ref 5–25)
CALCIUM SERPL-MCNC: 9.4 MG/DL (ref 8.3–10.1)
CHLORIDE SERPL-SCNC: 103 MMOL/L (ref 100–108)
CLARITY UR: CLEAR
CO2 SERPL-SCNC: 25 MMOL/L (ref 21–32)
COLOR UR: YELLOW
CREAT SERPL-MCNC: 0.71 MG/DL (ref 0.6–1.3)
EOSINOPHIL # BLD AUTO: 0.09 THOUSAND/ΜL (ref 0–0.61)
EOSINOPHIL NFR BLD AUTO: 1 % (ref 0–6)
ERYTHROCYTE [DISTWIDTH] IN BLOOD BY AUTOMATED COUNT: 13.2 % (ref 11.6–15.1)
GFR SERPL CREATININE-BSD FRML MDRD: 95 ML/MIN/1.73SQ M
GLUCOSE SERPL-MCNC: 107 MG/DL (ref 65–140)
GLUCOSE UR STRIP-MCNC: NEGATIVE MG/DL
HCT VFR BLD AUTO: 45.7 % (ref 34.8–46.1)
HGB BLD-MCNC: 15.1 G/DL (ref 11.5–15.4)
HGB UR QL STRIP.AUTO: NEGATIVE
IMM GRANULOCYTES # BLD AUTO: 0.01 THOUSAND/UL (ref 0–0.2)
IMM GRANULOCYTES NFR BLD AUTO: 0 % (ref 0–2)
KETONES UR STRIP-MCNC: NEGATIVE MG/DL
LEUKOCYTE ESTERASE UR QL STRIP: NEGATIVE
LIPASE SERPL-CCNC: 109 U/L (ref 73–393)
LYMPHOCYTES # BLD AUTO: 2 THOUSANDS/ΜL (ref 0.6–4.47)
LYMPHOCYTES NFR BLD AUTO: 30 % (ref 14–44)
MCH RBC QN AUTO: 28.1 PG (ref 26.8–34.3)
MCHC RBC AUTO-ENTMCNC: 33 G/DL (ref 31.4–37.4)
MCV RBC AUTO: 85 FL (ref 82–98)
MONOCYTES # BLD AUTO: 0.66 THOUSAND/ΜL (ref 0.17–1.22)
MONOCYTES NFR BLD AUTO: 10 % (ref 4–12)
NEUTROPHILS # BLD AUTO: 4 THOUSANDS/ΜL (ref 1.85–7.62)
NEUTS SEG NFR BLD AUTO: 59 % (ref 43–75)
NITRITE UR QL STRIP: NEGATIVE
NRBC BLD AUTO-RTO: 0 /100 WBCS
P AXIS: 59 DEGREES
PH UR STRIP.AUTO: 6 [PH]
PLATELET # BLD AUTO: 206 THOUSANDS/UL (ref 149–390)
PMV BLD AUTO: 12.3 FL (ref 8.9–12.7)
POTASSIUM SERPL-SCNC: 4 MMOL/L (ref 3.5–5.3)
PR INTERVAL: 150 MS
PROT SERPL-MCNC: 8 G/DL (ref 6.4–8.2)
PROT UR STRIP-MCNC: NEGATIVE MG/DL
QRS AXIS: 20 DEGREES
QRSD INTERVAL: 84 MS
QT INTERVAL: 404 MS
QTC INTERVAL: 460 MS
RBC # BLD AUTO: 5.38 MILLION/UL (ref 3.81–5.12)
SODIUM SERPL-SCNC: 138 MMOL/L (ref 136–145)
SP GR UR STRIP.AUTO: 1.01 (ref 1–1.03)
T WAVE AXIS: 158 DEGREES
TROPONIN I SERPL-MCNC: <0.02 NG/ML
UROBILINOGEN UR QL STRIP.AUTO: 0.2 E.U./DL
VENTRICULAR RATE: 78 BPM
WBC # BLD AUTO: 6.79 THOUSAND/UL (ref 4.31–10.16)

## 2019-10-25 PROCEDURE — 80053 COMPREHEN METABOLIC PANEL: CPT | Performed by: PHYSICIAN ASSISTANT

## 2019-10-25 PROCEDURE — 96361 HYDRATE IV INFUSION ADD-ON: CPT

## 2019-10-25 PROCEDURE — 99285 EMERGENCY DEPT VISIT HI MDM: CPT | Performed by: PHYSICIAN ASSISTANT

## 2019-10-25 PROCEDURE — 99284 EMERGENCY DEPT VISIT MOD MDM: CPT

## 2019-10-25 PROCEDURE — 81003 URINALYSIS AUTO W/O SCOPE: CPT | Performed by: PHYSICIAN ASSISTANT

## 2019-10-25 PROCEDURE — 85025 COMPLETE CBC W/AUTO DIFF WBC: CPT | Performed by: PHYSICIAN ASSISTANT

## 2019-10-25 PROCEDURE — 84484 ASSAY OF TROPONIN QUANT: CPT | Performed by: PHYSICIAN ASSISTANT

## 2019-10-25 PROCEDURE — 83690 ASSAY OF LIPASE: CPT | Performed by: PHYSICIAN ASSISTANT

## 2019-10-25 PROCEDURE — 93010 ELECTROCARDIOGRAM REPORT: CPT | Performed by: INTERNAL MEDICINE

## 2019-10-25 PROCEDURE — 36415 COLL VENOUS BLD VENIPUNCTURE: CPT | Performed by: PHYSICIAN ASSISTANT

## 2019-10-25 PROCEDURE — 93005 ELECTROCARDIOGRAM TRACING: CPT

## 2019-10-25 PROCEDURE — 96360 HYDRATION IV INFUSION INIT: CPT

## 2019-10-25 RX ADMIN — SODIUM CHLORIDE 1000 ML: 0.9 INJECTION, SOLUTION INTRAVENOUS at 14:28

## 2019-10-25 NOTE — ED PROVIDER NOTES
History  Chief Complaint   Patient presents with    Numbness     pt states that she has all over the body numbness , it has been going on for a long time but has gotten really bas as well as stomach pain     This 82-year-old female patient here for evaluation of generalized numbness  States this is a chronic issue getting progressively worse  She feels like her entire body is numb and burning from her head to her toes  This is being evaluated and worked up by her neurologist, Dr Carlita Torres  She has had extensive evaluation for this with no source  Chest complaining of abdominal pain which she has had before again getting progressively worse  Suprapubic and epigastric region  He has had no fever but does complain of hot flashes  She has history of dermatofibrosarcoma treated with excision in 2017  She states it was not done promptly and she is worried that she has spread of her cancer  She has other past surgical history of hysterectomy  Past medical history of diabetes hypertension hyperlipidemia  Complains of chronic diarrhea  History provided by:  Patient   used: No    Medical Problem   Location:  "entire body"  Quality:  Burning and numbness  Severity:  Moderate  Onset quality:  Gradual  Duration:  1 month  Timing:  Constant  Progression:  Worsening  Chronicity:  Recurrent  Associated symptoms: abdominal pain, diarrhea and myalgias    Associated symptoms: no chest pain, no congestion, no cough, no ear pain, no fatigue, no fever, no headaches, no loss of consciousness, no nausea, no rash, no rhinorrhea, no shortness of breath, no sore throat, no vomiting and no wheezing        Prior to Admission Medications   Prescriptions Last Dose Informant Patient Reported? Taking?    AMLODIPINE BESYLATE PO  Self Yes No   Sig: Take 10 mg by mouth daily    PROAIR  (90 Base) MCG/ACT inhaler  Self Yes No   Sig: Inhale 2 puffs every 4 (four) hours as needed for wheezing   aspirin 81 mg chewable tablet   No No   Sig: Chew 1 tablet (81 mg total) daily   atorvastatin (LIPITOR) 40 mg tablet   No No   Sig: Take 1 tablet (40 mg total) by mouth daily with dinner   escitalopram (LEXAPRO) 10 mg tablet  Self Yes No   Sig: Take 10 mg by mouth daily   gabapentin (NEURONTIN) 100 mg capsule   No No   Sig: Take 1 capsule (100 mg total) by mouth daily at bedtime   loperamide (IMODIUM) 2 mg capsule   Yes No   Sig: Take 2 mg by mouth as needed for diarrhea   loratadine (CLARITIN) 10 mg tablet  Self No No   Sig: Take 1 tablet by mouth daily   metFORMIN (GLUCOPHAGE) 500 mg tablet  Self Yes No   Sig: Take 500 mg by mouth 2 (two) times a day with meals   omega-3-acid ethyl esters (LOVAZA) 1 g capsule   Yes No   Sig: Take 2 g by mouth 2 (two) times a day      Facility-Administered Medications: None       Past Medical History:   Diagnosis Date    Asthma     Cancer ([facility] )     Depression     Diabetes insipidus ([facility] )     Diabetes mellitus ([facility] )     Hypertension     Stomach disorder        Past Surgical History:   Procedure Laterality Date    BREAST LUMPECTOMY      CHOLECYSTECTOMY      HYSTERECTOMY      KIDNEY STONE SURGERY      SKIN GRAFT         Family History   Problem Relation Age of Onset    Cancer Mother     Heart attack Mother     Cancer Father      I have reviewed and agree with the history as documented  Social History     Tobacco Use    Smoking status: Current Every Day Smoker     Packs/day: 0 20    Smokeless tobacco: Never Used   Substance Use Topics    Alcohol use: Yes    Drug use: No        Review of Systems   Constitutional: Negative for activity change, appetite change, chills, diaphoresis, fatigue, fever and unexpected weight change  HENT: Negative for congestion, ear pain, rhinorrhea, sinus pressure, sore throat and trouble swallowing  Eyes: Negative for photophobia and visual disturbance     Respiratory: Negative for apnea, cough, choking, chest tightness, shortness of breath, wheezing and stridor  Cardiovascular: Negative for chest pain, palpitations and leg swelling  Gastrointestinal: Positive for abdominal pain and diarrhea  Negative for abdominal distention, blood in stool, constipation, nausea and vomiting  Genitourinary: Negative for decreased urine volume, difficulty urinating, dysuria, enuresis, flank pain, frequency, hematuria and urgency  Musculoskeletal: Positive for myalgias  Negative for arthralgias, neck pain and neck stiffness  Skin: Negative for color change, pallor, rash and wound  Allergic/Immunologic: Negative  Neurological: Negative for dizziness, tremors, loss of consciousness, syncope, weakness, light-headedness, numbness and headaches  Hematological: Negative  Psychiatric/Behavioral: Negative  All other systems reviewed and are negative  Physical Exam  Physical Exam   Constitutional: She is oriented to person, place, and time  She appears well-developed and well-nourished  Non-toxic appearance  She does not have a sickly appearance  She does not appear ill  No distress  HENT:   Head: Normocephalic and atraumatic  Eyes: Pupils are equal, round, and reactive to light  EOM and lids are normal    Neck: Normal range of motion  Neck supple  Cardiovascular: Normal rate, regular rhythm, S1 normal, S2 normal, normal heart sounds, intact distal pulses and normal pulses  Exam reveals no gallop, no distant heart sounds, no friction rub and no decreased pulses  No murmur heard  Pulses:       Radial pulses are 2+ on the right side, and 2+ on the left side  Pulmonary/Chest: Effort normal and breath sounds normal  No accessory muscle usage  No apnea, no tachypnea and no bradypnea  No respiratory distress  She has no decreased breath sounds  She has no wheezes  She has no rhonchi  She has no rales  Abdominal: Soft  Normal appearance  She exhibits no distension  There is tenderness in the epigastric area   There is no rigidity, no rebound and no guarding  Musculoskeletal: Normal range of motion  She exhibits no edema, tenderness or deformity  Neurological: She is alert and oriented to person, place, and time  No cranial nerve deficit  GCS eye subscore is 4  GCS verbal subscore is 5  GCS motor subscore is 6  GCS 15  AAOx3  No focal neuro deficits  CN II-XII grossly intact  Speech normal, no aphasia or dysarthria  No pronator drift  Cerebellar function normal  Finger to nose normal  PERRL  EOMI  Peripheral vision intact  No nystagmus  Upper and lower extremity strength 5/5 through   strength 5/5 b/l  Gross sensation intact b/l  Skin: Skin is warm, dry and intact  No rash noted  She is not diaphoretic  No erythema  No pallor  Psychiatric: Her speech is normal    Nursing note and vitals reviewed        Vital Signs  ED Triage Vitals   Temperature Pulse Respirations Blood Pressure SpO2   10/25/19 1407 10/25/19 1406 10/25/19 1406 10/25/19 1406 10/25/19 1406   98 1 °F (36 7 °C) 81 18 149/70 99 %      Temp Source Heart Rate Source Patient Position - Orthostatic VS BP Location FiO2 (%)   10/25/19 1407 10/25/19 1406 10/25/19 1406 10/25/19 1406 --   Oral Monitor Lying Right arm       Pain Score       --                  Vitals:    10/25/19 1406 10/25/19 1500 10/25/19 1600 10/25/19 1630   BP: 149/70 147/68 136/62 145/63   Pulse: 81 75 74 76   Patient Position - Orthostatic VS: Lying Lying  Lying         Visual Acuity  Visual Acuity      Most Recent Value   L Pupil Size (mm)  4   R Pupil Size (mm)  4          ED Medications  Medications   sodium chloride 0 9 % bolus 1,000 mL (0 mL Intravenous Stopped 10/25/19 1642)       Diagnostic Studies  Results Reviewed     Procedure Component Value Units Date/Time    Comprehensive metabolic panel [[id number]] Collected:  10/25/19 1427    Lab Status:  Final result Specimen:  Blood from Arm, Right Updated:  10/25/19 1539     Sodium 138 mmol/L      Potassium 4 0 mmol/L      Chloride 103 mmol/L      CO2 25 mmol/L ANION GAP 10 mmol/L      BUN 11 mg/dL      Creatinine 0 71 mg/dL      Glucose 107 mg/dL      Calcium 9 4 mg/dL      AST 29 U/L      ALT 39 U/L      Alkaline Phosphatase 84 U/L      Total Protein 8 0 g/dL      Albumin 4 0 g/dL      Total Bilirubin 0 80 mg/dL      eGFR 95 ml/min/1 73sq m     Narrative:       National Kidney Disease Foundation guidelines for Chronic Kidney Disease (CKD):     Stage 1 with normal or high GFR (GFR > 90 mL/min/1 73 square meters)    Stage 2 Mild CKD (GFR = 60-89 mL/min/1 73 square meters)    Stage 3A Moderate CKD (GFR = 45-59 mL/min/1 73 square meters)    Stage 3B Moderate CKD (GFR = 30-44 mL/min/1 73 square meters)    Stage 4 Severe CKD (GFR = 15-29 mL/min/1 73 square meters)    Stage 5 End Stage CKD (GFR <15 mL/min/1 73 square meters)  Note: GFR calculation is accurate only with a steady state creatinine    Lipase [[id number]]  (Normal) Collected:  10/25/19 1427    Lab Status:  Final result Specimen:  Blood from Arm, Right Updated:  10/25/19 1539     Lipase 109 u/L     Troponin I [[id number]]  (Normal) Collected:  10/25/19 1427    Lab Status:  Final result Specimen:  Blood from Arm, Right Updated:  10/25/19 1537     Troponin I <0 02 ng/mL     UA w Reflex to Microscopic w Reflex to Culture [[id number]] Collected:  10/25/19 1504    Lab Status:  Final result Specimen:  Urine, Other Updated:  10/25/19 1529     Color, UA Yellow     Clarity, UA Clear     Specific Gravity, UA 1 010     pH, UA 6 0     Leukocytes, UA Negative     Nitrite, UA Negative     Protein, UA Negative mg/dl      Glucose, UA Negative mg/dl      Ketones, UA Negative mg/dl      Urobilinogen, UA 0 2 E U /dl      Bilirubin, UA Negative     Blood, UA Negative    CBC and differential [[id number]]  (Abnormal) Collected:  10/25/19 1427    Lab Status:  Final result Specimen:  Blood from Arm, Right Updated:  10/25/19 1441     WBC 6 79 Thousand/uL      RBC 5 38 Million/uL      Hemoglobin 15 1 g/dL      Hematocrit 45 7 %      MCV 85 fL      MCH 28 1 pg      MCHC 33 0 g/dL      RDW 13 2 %      MPV 12 3 fL      Platelets 879 Thousands/uL      nRBC 0 /100 WBCs      Neutrophils Relative 59 %      Immat GRANS % 0 %      Lymphocytes Relative 30 %      Monocytes Relative 10 %      Eosinophils Relative 1 %      Basophils Relative 0 %      Neutrophils Absolute 4 00 Thousands/µL      Immature Grans Absolute 0 01 Thousand/uL      Lymphocytes Absolute 2 00 Thousands/µL      Monocytes Absolute 0 66 Thousand/µL      Eosinophils Absolute 0 09 Thousand/µL      Basophils Absolute 0 03 Thousands/µL                  No orders to display              Procedures  ECG 12 Lead Documentation Only  Date/Time: 10/25/2019 2:31 PM  Performed by: Mercy Cisneros PA-C  Authorized by: Mercy Cisneros PA-C     Indications / Diagnosis:  "entire body burning"  ECG reviewed by me, the ED Provider: yes    Patient location:  ED  Previous ECG:     Previous ECG:  Compared to current    Comparison ECG info:  April 12, 2019    Similarity:  No change    Comparison to cardiac monitor: Yes    Interpretation:     Interpretation: abnormal    Quality:     Tracing quality:  Limited by artifact  Rate:     ECG rate:  78    ECG rate assessment: normal    Rhythm:     Rhythm: sinus rhythm    Ectopy:     Ectopy: none    QRS:     QRS axis:  Normal    QRS intervals:  Normal  Conduction:     Conduction: normal    ST segments:     ST segments:  Normal  T waves:     T waves: inverted      Inverted:  V4, V5, V6 and I           ED Course  ED Course as of Oct 25 1650   Fri Oct 25, 2019   1544 Paged Dr Scot Shabazz  [address] with Dr Ernesto Hua  Recommends increasing gabapentin  No indication for admission at this point  Discussed gabapentin with patient but she refuses to start this medication              HEART Risk Score      Most Recent Value   History  0 Filed at: 10/25/2019 1621   ECG  1 Filed at: 10/25/2019 1621   Age  1 Filed at: 10/25/2019 1621   Risk Factors  2 Filed at: 10/25/2019 1621   Troponin 0 Filed at: 10/25/2019 1621   Heart Score Risk Calculator   History  0 Filed at: 10/25/2019 1621   ECG  1 Filed at: 10/25/2019 1621   Age  1 Filed at: 10/25/2019 1621   Risk Factors  2 Filed at: 10/25/2019 1621   Troponin  0 Filed at: 10/25/2019 1621   HEART Score  4 Filed at: 10/25/2019 1621   HEART Score  4 Filed at: 10/25/2019 1621                            MDM  Number of Diagnoses or Management Options  Paresthesias: new and requires workup  Diagnosis management comments: ddx includes but is not limited metabolic abnormality, renal failure, rheum etiology, low suspicion MS, infection, meningitis  Plan: Cardiac workup  CBC, chemistry  Amount and/or Complexity of Data Reviewed  Clinical lab tests: ordered and reviewed  Independent visualization of images, tracings, or specimens: yes    Risk of Complications, Morbidity, and/or Mortality  Presenting problems: moderate  Management options: low  General comments: 65 yo female with burning/tingling/numbness of entire body  Workup here is normal  Has non-focal neuro exam and normal vitals  Discussed with on call neurologist who recommends increasing gabapentin  She refuses to take this medication due to side effects/adverse effects  Recommended f/u with her neurologist  Suspect this could be related to stress/anxiety or conversion disorder  I explained to patient that I think this is contributing to her symptoms  She will f/u as outpatient  Return parameters provided  Pt understands and agrees with plan  Her EKG did have T-wave inversions in her anterior lateral leads however it is unchanged from prior, she is not have any chest pain or shortness of breath  No symptoms of ACS or MI  Her troponin is negative despite days of symptoms  Her abdominal pain is a chronic recurrent issue for her which she has gone to GI for  Did recommend she see GI in follow-up to discuss possible endoscopy  Recommended omeprazole    Did inform her to return to ER if she has chest pain or trouble breathing  Patient Progress  Patient progress: stable      Disposition  Final diagnoses:   Paresthesias     Time reflects when diagnosis was documented in both MDM as applicable and the Disposition within this note     Time User Action Codes Description Comment    10/25/2019  4:19 PM Eileen Alfonso Add [R20 2] Paresthesias       ED Disposition     ED Disposition Condition Date/Time Comment    Discharge Stable Fri Oct 25, 2019  4:19 PM 2025 [facility] discharge to home/self care  Follow-up Information     Follow up With Specialties Details [address] [facility] Audiology Call   [place] [address]  [phone]            Patient's Medications   Discharge Prescriptions    No medications on file     No discharge procedures on file      ED Provider  Electronically Signed by           Mary Dowling PA-C  [facility]/19 [facility]  10/25/19 9115

## 2019-11-01 ENCOUNTER — TELEPHONE (OUTPATIENT)
Dept: SURGICAL ONCOLOGY | Facility: CLINIC | Age: 59
End: 2019-11-01

## 2019-11-01 NOTE — TELEPHONE ENCOUNTER
New Patient Encounter    New Patient Intake Form   Patient Details:  Percy Gauthier  1960  [id number]    Background Information:  [address] starts by opening a telephone encounter and gathering the following information   Who is calling to schedule? If not self, relationship to patient? Self   Referring Provider Self   What is the diagnosis? dermatofibrosarcoma   When was the diagnosis? 2017   Is patient aware of diagnosis? Yes   Reason for visit? History Of   Have you had any testing done? If so: when, where? Yes   Are records in [place]? yes   Was the patient told to bring a disk? no   Scheduling Information:   Preferred Dwarf:  Any     Requesting Specific Provider? no   Are there any dates/time the patient cannot be seen? no   Counseling Pre-Screen:  If the patient answers YES to any of the below questions, please route to the appropriate location specific counselor    Have you felt anxious or worried about cancer and the treatment you are receiving? No   Has your diagnosis caused physical, emotional, or financial hardship for you? No   Note: Do not ask the patient about transportation issues/needs  Please notate if the patient brings it up and the counselor will schedule accordingly  Miscellaneous: na    After completing the above information, please route to Financial Counselor and the appropriate Nurse Navigator for review

## 2019-11-07 ENCOUNTER — CONSULT (OUTPATIENT)
Dept: SURGICAL ONCOLOGY | Facility: CLINIC | Age: 59
End: 2019-11-07
Payer: COMMERCIAL

## 2019-11-07 VITALS
HEART RATE: 72 BPM | HEIGHT: 64 IN | RESPIRATION RATE: 16 BRPM | BODY MASS INDEX: 33.46 KG/M2 | WEIGHT: 196 LBS | SYSTOLIC BLOOD PRESSURE: 124 MMHG | TEMPERATURE: 97.3 F | DIASTOLIC BLOOD PRESSURE: 84 MMHG

## 2019-11-07 DIAGNOSIS — C44.99 DERMATOFIBROSARCOMA: Primary | ICD-10-CM

## 2019-11-07 PROCEDURE — 99243 OFF/OP CNSLTJ NEW/EST LOW 30: CPT | Performed by: SURGERY

## 2019-11-07 NOTE — PROGRESS NOTES
Surgical Oncology Consult       [address]  [facility]  [facility]ER [facility] SURGICAL ONCOLOGY DEANNAYOLANDE [address]  [facility]  [address]  1960  [phone]  [address]  [facility]  [facility] SURGICAL ONCOLOGY DEANNAYOLANDE  [address]  [facility] [place] [address]    Diagnoses and all orders for this visit:    Dermatofibrosarcoma        Chief Complaint   Patient presents with    Consult     Pt here to establish care for DFSP surveillance  Return in about 6 months (around 5/7/2020)  Dermatofibrosarcoma    2/24/2017 Initial Diagnosis     Dermatofibrosarcoma of left neck      5/3/2017 Surgery     Wide excision  Dermatofibrosarcoma protuberans, tumor present 0 1cm from inferior-lateral margin (closest margin)         History of Present Illness:  57-year-old female who in 2017 underwent biopsy of a neck mass  This revealed dermatofibrosarcoma protuberance  This was on March 6, 2017  On May 3, 2017 she underwent wide excision of this  The dermatofibrosarcoma branch measured 1 4 cm  Despite undergoing a wide excision that was 9 5 x 6 5 x 4 cm, she had a tumor present at the inferior lateral margin that was within 1 mm  At that time she has elected to have no further surgery  She has been observed  Her surgical oncologist relocated and she comes in now to establish follow-up  Over the last several years she has been noticing increasing tremors with worsening fatigue  She has lost 5 lb over the last 6 months  Her appetite is only fair  No nausea or vomiting  She is following up with a neurologist given her new neurologic symptoms  Review of Systems  Complete ROS Surg Onc:   Constitutional: The patient has fatigue, change in appetite and weight loss  Eyes: No complaints of visual problems, no scleral icterus     ENT: no complaints of ear pain, no hoarseness, no difficulty swallowing,  no tinnitus and no new masses in head, oral cavity, or neck    Cardiovascular: No complaints of chest pain, no palpitations, no ankle edema  Respiratory: No complaints of shortness of breath, no cough  Gastrointestinal: No complaints of jaundice, no bloody stools, no pale stools  Genitourinary: No complaints of dysuria, no hematuria, no nocturia, no frequent urination, no urethral discharge  Musculoskeletal: No complaints of weakness, paralysis, joint stiffness or arthralgias  She has tremors  Integumentary: No complaints of rash, no new lesions  Neurological: No complaints of convulsions, no seizures, no dizziness  Hematologic/Lymphatic: No complaints of easy bruising  Endocrine:  No hot or cold intolerance  No polydipsia, polyphagia, or polyuria  Allergy/immunology:  No environmental allergies  No food allergies  Not immunocompromised  Skin:  No pallor or rash  No wound            Patient Active Problem List   Diagnosis    Dermatofibrosarcoma    Type 2 diabetes mellitus without complication (HCC)    L[place] arthritis ([place] [address] )    IBS (irritable bowel syndrome)    Hyperlipidemia    Essential tremor    Essential (primary) hypertension    Allergic asthma    Numbness    Carotid artery aneurysm (HCC)    Anxiety     Past Medical History:   Diagnosis Date    Asthma     Cancer ([place] [address] )     Depression     Diabetes insipidus ([place] [address] )     Diabetes mellitus ([place] [address] )     Hypertension     Stomach disorder      Past Surgical History:   Procedure Laterality Date    BREAST LUMPECTOMY      CHOLECYSTECTOMY      KIDNEY STONE SURGERY      SKIN GRAFT      TOTAL ABDOMINAL HYSTERECTOMY       Family History   Problem Relation Age of Onset    Heart attack Mother     Lung cancer Mother 61    Kidney cancer Father 39    Stomach cancer Maternal Grandmother         79-80     Social History     Socioeconomic History    Marital status: /Civil Union     Spouse name: Not on file    Number of children: Not on file    Years of education: Not on file    Highest education level: Not on file   Occupational History    Not on file   Social Needs    Financial resource strain: Not on file    Food insecurity:     Worry: Not on file     Inability: Not on file    Transportation needs:     Medical: Not on file     Non-medical: Not on file   Tobacco Use    Smoking status: Current Every Day Smoker     Packs/day: 0 20    Smokeless tobacco: Never Used   Substance and Sexual Activity    Alcohol use: Not Currently    Drug use: No    Sexual activity: Not on file   Lifestyle    Physical activity:     Days per week: Not on file     Minutes per session: Not on file    Stress: Not on file   Relationships    Social connections:     Talks on phone: Not on file     Gets together: Not on file     Attends [employer] service: Not on file     Active member of club or organization: Not on file     Attends meetings of clubs or organizations: Not on file     Relationship status: Not on file    Intimate partner violence:     Fear of current or ex partner: Not on file     Emotionally abused: Not on file     Physically abused: Not on file     Forced sexual activity: Not on file   Other Topics Concern    Not on file   Social History Narrative    Not on file       Current Outpatient Medications:     AMLODIPINE BESYLATE PO, Take 10 mg by mouth daily , Disp: , Rfl:     aspirin 81 mg chewable tablet, Chew 1 tablet (81 mg total) daily, Disp: 30 tablet, Rfl: 0    escitalopram (LEXAPRO) 10 mg tablet, Take 10 mg by mouth daily, Disp: , Rfl:     loperamide (IMODIUM) 2 mg capsule, Take 2 mg by mouth as needed for diarrhea, Disp: , Rfl:     metFORMIN (GLUCOPHAGE) 500 mg tablet, Take 500 mg by mouth 2 (two) times a day with meals, Disp: , Rfl:     omega-3-acid ethyl esters (LOVAZA) 1 g capsule, Take 2 g by mouth 2 (two) times a day, Disp: , Rfl: 3    PROAIR  (90 Base) MCG/ACT inhaler, Inhale 2 puffs every 4 (four) hours as needed for wheezing, Disp: , Rfl: 3    atorvastatin (LIPITOR) 40 mg tablet, Take 1 tablet (40 mg total) by mouth daily with dinner (Patient not taking: Reported on 11/7/2019), Disp: 30 tablet, Rfl: 0    gabapentin (NEURONTIN) 100 mg capsule, Take 1 capsule (100 mg total) by mouth daily at bedtime (Patient not taking: Reported on 11/7/2019), Disp: 30 capsule, Rfl: 5    loratadine (CLARITIN) 10 mg tablet, Take 1 tablet by mouth daily (Patient not taking: Reported on 11/7/2019), Disp: 20 tablet, Rfl: 0  No Known Allergies  Vitals:    11/07/19 0835   BP: 124/84   Pulse: 72   Resp: 16   Temp: (!) 97 3 °F (36 3 °C)       Physical Exam   Constitutional: General appearance: The Patient is well-developed and well-nourished who appears the stated age in no acute distress  Patient is pleasant and talkative  HEENT:  Normocephalic  Sclerae are anicteric  Mucous membranes are moist  Neck is supple without adenopathy  No JVD  Chest: The lungs are clear to auscultation  Cardiac: Heart is regular rate  Abdomen: Abdomen is soft, non-tender, non-distended and without masses  Extremities: There is no clubbing or cyanosis  There is no edema  Symmetric  Neuro: Grossly nonfocal  Gait is normal      Lymphatic: No evidence of cervical adenopathy bilaterally  No evidence of axillary adenopathy bilaterally  No evidence of inguinal adenopathy bilaterally  Skin: Warm, anicteric  Wide excision site has healed well  No evidence of local recurrence or in-transit disease  Psych:  Patient is pleasant and talkative  Breasts:      Pathology:  [unfilled]    Labs:      Imaging  No results found  I reviewed the above laboratory and imaging data  Discussion/Summary:  28-year-old female status post wide excision of a dermatofibrosarcoma protuberans  She is clinically SUSAN at 2-1/2 years  There is no evidence of recurrence by physical exam   I doubt her neurologic symptoms are related to this  This tumor is more prone to local recurrence then distant metastasis    I have recommended observation  She will follow up with Neurology  I will see her again in 6 months for another clinical exam   She is agreeable to this  All her questions were answered

## 2019-11-15 ENCOUNTER — APPOINTMENT (EMERGENCY)
Dept: CT IMAGING | Facility: HOSPITAL | Age: 59
End: 2019-11-15
Payer: COMMERCIAL

## 2019-11-15 ENCOUNTER — HOSPITAL ENCOUNTER (EMERGENCY)
Facility: HOSPITAL | Age: 59
Discharge: HOME | End: 2019-11-15
Attending: EMERGENCY MEDICINE | Admitting: EMERGENCY MEDICINE
Payer: COMMERCIAL

## 2019-11-15 VITALS
SYSTOLIC BLOOD PRESSURE: 142 MMHG | DIASTOLIC BLOOD PRESSURE: 73 MMHG | TEMPERATURE: 97.8 F | HEART RATE: 68 BPM | OXYGEN SATURATION: 99 % | RESPIRATION RATE: 16 BRPM

## 2019-11-15 DIAGNOSIS — N20.0 KIDNEY STONE: Primary | ICD-10-CM

## 2019-11-15 LAB
ALBUMIN SERPL BCP-MCNC: 4.1 G/DL (ref 3.5–5)
ALP SERPL-CCNC: 85 U/L (ref 46–116)
ALT SERPL W P-5'-P-CCNC: 36 U/L (ref 12–78)
ANION GAP SERPL CALCULATED.3IONS-SCNC: 11 MMOL/L (ref 4–13)
AST SERPL W P-5'-P-CCNC: 27 U/L (ref 5–45)
BACTERIA UR QL AUTO: ABNORMAL /HPF
BASOPHILS # BLD AUTO: 0.04 THOUSANDS/ΜL (ref 0–0.1)
BASOPHILS NFR BLD AUTO: 0 % (ref 0–1)
BILIRUB SERPL-MCNC: 0.6 MG/DL (ref 0.2–1)
BILIRUB UR QL STRIP: NEGATIVE
BUN SERPL-MCNC: 11 MG/DL (ref 5–25)
CALCIUM SERPL-MCNC: 9.3 MG/DL (ref 8.3–10.1)
CHLORIDE SERPL-SCNC: 102 MMOL/L (ref 100–108)
CLARITY UR: ABNORMAL
CO2 SERPL-SCNC: 25 MMOL/L (ref 21–32)
COLOR UR: YELLOW
CREAT SERPL-MCNC: 0.67 MG/DL (ref 0.6–1.3)
EOSINOPHIL # BLD AUTO: 0.14 THOUSAND/ΜL (ref 0–0.61)
EOSINOPHIL NFR BLD AUTO: 2 % (ref 0–6)
ERYTHROCYTE [DISTWIDTH] IN BLOOD BY AUTOMATED COUNT: 13.3 % (ref 11.6–15.1)
GFR SERPL CREATININE-BSD FRML MDRD: 98 ML/MIN/1.73SQ M
GLUCOSE SERPL-MCNC: 95 MG/DL (ref 65–140)
GLUCOSE UR STRIP-MCNC: NEGATIVE MG/DL
HCT VFR BLD AUTO: 43.1 % (ref 34.8–46.1)
HGB BLD-MCNC: 14.1 G/DL (ref 11.5–15.4)
HGB UR QL STRIP.AUTO: ABNORMAL
IMM GRANULOCYTES # BLD AUTO: 0.03 THOUSAND/UL (ref 0–0.2)
IMM GRANULOCYTES NFR BLD AUTO: 0 % (ref 0–2)
KETONES UR STRIP-MCNC: NEGATIVE MG/DL
LEUKOCYTE ESTERASE UR QL STRIP: NEGATIVE
LIPASE SERPL-CCNC: 110 U/L (ref 73–393)
LYMPHOCYTES # BLD AUTO: 3.17 THOUSANDS/ΜL (ref 0.6–4.47)
LYMPHOCYTES NFR BLD AUTO: 36 % (ref 14–44)
MCH RBC QN AUTO: 28 PG (ref 26.8–34.3)
MCHC RBC AUTO-ENTMCNC: 32.7 G/DL (ref 31.4–37.4)
MCV RBC AUTO: 86 FL (ref 82–98)
MONOCYTES # BLD AUTO: 0.8 THOUSAND/ΜL (ref 0.17–1.22)
MONOCYTES NFR BLD AUTO: 9 % (ref 4–12)
NEUTROPHILS # BLD AUTO: 4.71 THOUSANDS/ΜL (ref 1.85–7.62)
NEUTS SEG NFR BLD AUTO: 53 % (ref 43–75)
NITRITE UR QL STRIP: NEGATIVE
NON-SQ EPI CELLS URNS QL MICRO: ABNORMAL /HPF
NRBC BLD AUTO-RTO: 0 /100 WBCS
PH UR STRIP.AUTO: 6 [PH]
PLATELET # BLD AUTO: 211 THOUSANDS/UL (ref 149–390)
PMV BLD AUTO: 11.9 FL (ref 8.9–12.7)
POTASSIUM SERPL-SCNC: 3.7 MMOL/L (ref 3.5–5.3)
PROT SERPL-MCNC: 7.7 G/DL (ref 6.4–8.2)
PROT UR STRIP-MCNC: ABNORMAL MG/DL
RBC # BLD AUTO: 5.04 MILLION/UL (ref 3.81–5.12)
RBC #/AREA URNS AUTO: ABNORMAL /HPF
SODIUM SERPL-SCNC: 138 MMOL/L (ref 136–145)
SP GR UR STRIP.AUTO: 1.01 (ref 1–1.03)
UROBILINOGEN UR QL STRIP.AUTO: 0.2 E.U./DL
WBC # BLD AUTO: 8.89 THOUSAND/UL (ref 4.31–10.16)
WBC #/AREA URNS AUTO: ABNORMAL /HPF

## 2019-11-15 PROCEDURE — 80053 COMPREHEN METABOLIC PANEL: CPT

## 2019-11-15 PROCEDURE — 83690 ASSAY OF LIPASE: CPT

## 2019-11-15 PROCEDURE — 85025 COMPLETE CBC W/AUTO DIFF WBC: CPT

## 2019-11-15 PROCEDURE — 74177 CT ABD & PELVIS W/CONTRAST: CPT

## 2019-11-15 PROCEDURE — 99284 EMERGENCY DEPT VISIT MOD MDM: CPT | Performed by: EMERGENCY MEDICINE

## 2019-11-15 PROCEDURE — 99284 EMERGENCY DEPT VISIT MOD MDM: CPT

## 2019-11-15 PROCEDURE — 36415 COLL VENOUS BLD VENIPUNCTURE: CPT

## 2019-11-15 PROCEDURE — 81001 URINALYSIS AUTO W/SCOPE: CPT | Performed by: EMERGENCY MEDICINE

## 2019-11-15 RX ORDER — TAMSULOSIN HYDROCHLORIDE 0.4 MG/1
0.4 CAPSULE ORAL
Qty: 7 CAPSULE | Refills: 0 | Status: SHIPPED | OUTPATIENT
Start: 2019-11-15 | End: 2020-05-08

## 2019-11-15 RX ORDER — OXYCODONE HYDROCHLORIDE AND ACETAMINOPHEN 5; 325 MG/1; MG/1
1 TABLET ORAL EVERY 6 HOURS PRN
Qty: 12 TABLET | Refills: 0 | Status: SHIPPED | OUTPATIENT
Start: 2019-11-15 | End: 2019-11-20

## 2019-11-15 RX ADMIN — IOHEXOL 100 ML: 350 INJECTION, SOLUTION INTRAVENOUS at 17:15

## 2019-11-15 NOTE — ED PROVIDER NOTES
History  Chief Complaint   Patient presents with    Blood in Urine     Patient states she has blood in her urine  Patient states she was evaluated her a few weeks ago for the same problem   Back Pain     Patient is also c/o having a feeling of "burning in her back  HPI     73-year-old female with history of Lyme disease, irritable bowel syndrome with chronic diarrhea, constant burning sensation throughout her entire body for which she has had an extensive neurologic workup and is being followed by Neurology, who presents for evaluation of blood in her urine  This has been occurring intermittently for the last week  States that her urine is pink in color  Denies vaginal discharge or vaginal bleeding  Endorses vague LLQ abdominal pain rated as moderate in intensity, without aggravating or alleviating factors  Reports diarrhea that is at her baseline  No dysuria or frequency  Patient also reports a burning sensation across her lower back, this is chronic and has been present for months, not worse than usual   This is not a new problem today  No fevers or chills  No radiation of the pain down her back  No urinary incontinence, leg weakness or numbness  Prior to Admission Medications   Prescriptions Last Dose Informant Patient Reported? Taking?    AMLODIPINE BESYLATE PO  Self Yes No   Sig: Take 10 mg by mouth daily    PROAIR  (90 Base) MCG/ACT inhaler  Self Yes No   Sig: Inhale 2 puffs every 4 (four) hours as needed for wheezing   aspirin 81 mg chewable tablet   No No   Sig: Chew 1 tablet (81 mg total) daily   atorvastatin (LIPITOR) 40 mg tablet   No No   Sig: Take 1 tablet (40 mg total) by mouth daily with dinner   Patient not taking: Reported on 11/7/2019   escitalopram (LEXAPRO) 10 mg tablet  Self Yes No   Sig: Take 10 mg by mouth daily   gabapentin (NEURONTIN) 100 mg capsule   No No   Sig: Take 1 capsule (100 mg total) by mouth daily at bedtime   Patient not taking: Reported on 11/7/2019 loperamide (IMODIUM) 2 mg capsule   Yes No   Sig: Take 2 mg by mouth as needed for diarrhea   loratadine (CLARITIN) 10 mg tablet  Self No No   Sig: Take 1 tablet by mouth daily   Patient not taking: Reported on 11/7/2019   metFORMIN (GLUCOPHAGE) 500 mg tablet  Self Yes No   Sig: Take 500 mg by mouth 2 (two) times a day with meals   omega-3-acid ethyl esters (LOVAZA) 1 g capsule   Yes No   Sig: Take 2 g by mouth 2 (two) times a day      Facility-Administered Medications: None       Past Medical History:   Diagnosis Date    Asthma     Cancer ([facility] )     Depression     Diabetes insipidus ([facility] )     Diabetes mellitus ([place] [address] )     Hypertension     Stomach disorder        Past Surgical History:   Procedure Laterality Date    BREAST LUMPECTOMY      CHOLECYSTECTOMY      KIDNEY STONE SURGERY      SKIN GRAFT      TOTAL ABDOMINAL HYSTERECTOMY         Family History   Problem Relation Age of Onset    Heart attack Mother     Lung cancer Mother 61    Kidney cancer Father 39    Stomach cancer Maternal Grandmother         79-80     I have reviewed and agree with the history as documented  Social History     Tobacco Use    Smoking status: Current Every Day Smoker     Packs/day: 0 20    Smokeless tobacco: Never Used   Substance Use Topics    Alcohol use: Not Currently    Drug use: No        Review of Systems   Constitutional: Negative for chills and fever  HENT: Negative for congestion  Eyes: Negative for visual disturbance  Respiratory: Negative for cough and shortness of breath  Cardiovascular: Negative for chest pain and leg swelling  Gastrointestinal: Positive for abdominal pain  Negative for blood in stool, constipation, diarrhea, nausea and vomiting  Genitourinary: Positive for hematuria  Negative for difficulty urinating, dysuria, flank pain, frequency, vaginal bleeding, vaginal discharge and vaginal pain  Musculoskeletal: Positive for back pain (chronic, unchanged from baseline)   Negative for arthralgias, neck pain and neck stiffness  Skin: Negative for rash  Neurological: Negative for weakness, numbness and headaches  Diffuse paresthesias, chronic, unchanged from baseline   Psychiatric/Behavioral: Negative for agitation, behavioral problems and confusion  Physical Exam  Physical Exam   Constitutional: She is oriented to person, place, and time  She appears well-developed and well-nourished  No distress  HENT:   Head: Normocephalic and atraumatic  Right Ear: External ear normal    Left Ear: External ear normal    Nose: Nose normal    Mouth/Throat: Oropharynx is clear and moist    Eyes: Conjunctivae are normal    Neck: Normal range of motion  Neck supple  Cardiovascular: Normal rate, regular rhythm, normal heart sounds and intact distal pulses  Exam reveals no gallop and no friction rub  No murmur heard  Pulmonary/Chest: Effort normal and breath sounds normal  No respiratory distress  She has no wheezes  She has no rales  Abdominal: Soft  Bowel sounds are normal  She exhibits no distension  There is tenderness (LLQ)  There is no guarding  Musculoskeletal: Normal range of motion  She exhibits no edema or deformity  No midline TTP over the C, T, or L spine   Neurological: She is alert and oriented to person, place, and time  She exhibits normal muscle tone  5/5 strength in the proximal and distal bilateral LEs, no saddle anesthesia, intact sensation to light touch   Skin: Skin is warm and dry  She is not diaphoretic         Vital Signs  ED Triage Vitals [11/15/19 1408]   Temperature Pulse Respirations Blood Pressure SpO2   97 8 °F (36 6 °C) 77 17 163/73 98 %      Temp Source Heart Rate Source Patient Position - Orthostatic VS BP Location FiO2 (%)   Oral Monitor Sitting Left arm --      Pain Score       8           Vitals:    11/15/19 1408 11/15/19 1610   BP: 163/73 142/73   Pulse: 77 68   Patient Position - Orthostatic VS: Sitting Lying         Visual Acuity      ED Medications  Medications   iohexol (OMNIPAQUE) 350 MG/ML injection (MULTI-DOSE) 100 mL (100 mL Intravenous Given 11/15/19 1055)       Diagnostic Studies  Results Reviewed     Procedure Component Value Units Date/Time    Comprehensive metabolic panel [[id number]] Collected:  11/15/19 1612    Lab Status:  Final result Specimen:  Blood from Arm, Right Updated:  11/15/19 1649     Sodium 138 mmol/L      Potassium 3 7 mmol/L      Chloride 102 mmol/L      CO2 25 mmol/L      ANION GAP 11 mmol/L      BUN 11 mg/dL      Creatinine 0 67 mg/dL      Glucose 95 mg/dL      Calcium 9 3 mg/dL      AST 27 U/L      ALT 36 U/L      Alkaline Phosphatase 85 U/L      Total Protein 7 7 g/dL      Albumin 4 1 g/dL      Total Bilirubin 0 60 mg/dL      eGFR 98 ml/min/1 73sq m     Narrative:       [place] guidelines for Chronic Kidney Disease (CKD):     Stage 1 with normal or high GFR (GFR > 90 mL/min/1 73 square meters)    Stage 2 Mild CKD (GFR = 60-89 mL/min/1 73 square meters)    Stage 3A Moderate CKD (GFR = 45-59 mL/min/1 73 square meters)    Stage 3B Moderate CKD (GFR = 30-44 mL/min/1 73 square meters)    Stage 4 Severe CKD (GFR = 15-29 mL/min/1 73 square meters)    Stage 5 End Stage CKD (GFR <15 mL/min/1 73 square meters)  Note: GFR calculation is accurate only with a steady state creatinine    Lipase [[id number]]  (Normal) Collected:  11/15/19 1612    Lab Status:  Final result Specimen:  Blood from Arm, Right Updated:  11/15/19 1649     Lipase 110 u/L     Urine Microscopic [[id number]]  (Abnormal) Collected:  11/15/19 1610    Lab Status:  Final result Specimen:  Urine, Clean Catch Updated:  11/15/19 1629     RBC, UA Innumerable /hpf      WBC, UA None Seen /hpf      Epithelial Cells Occasional /hpf      Bacteria, UA Occasional /hpf     UA w Reflex to Microscopic w Reflex to Culture [[id number]]  (Abnormal) Collected:  11/15/19 1610    Lab Status:  Final result Specimen:  Urine, Clean Catch Updated:  11/15/19 1621     Color, UA Yellow     Clarity, UA Cloudy     Specific Hoskins, UA 1 010     pH, UA 6 0     Leukocytes, UA Negative     Nitrite, UA Negative     Protein, UA 30 (1+) mg/dl      Glucose, UA Negative mg/dl      Ketones, UA Negative mg/dl      Urobilinogen, UA 0 2 E U /dl      Bilirubin, UA Negative     Blood, UA Large    CBC and differential [[id number]] Collected:  11/15/19 1612    Lab Status:  Final result Specimen:  Blood from Arm, Right Updated:  11/15/19 1621     WBC 8 89 Thousand/uL      RBC 5 04 Million/uL      Hemoglobin 14 1 g/dL      Hematocrit 43 1 %      MCV 86 fL      MCH 28 0 pg      MCHC 32 7 g/dL      RDW 13 3 %      MPV 11 9 fL      Platelets 054 Thousands/uL      nRBC 0 /100 WBCs      Neutrophils Relative 53 %      Immat GRANS % 0 %      Lymphocytes Relative 36 %      Monocytes Relative 9 %      Eosinophils Relative 2 %      Basophils Relative 0 %      Neutrophils Absolute 4 71 Thousands/µL      Immature Grans Absolute 0 03 Thousand/uL      Lymphocytes Absolute 3 17 Thousands/µL      Monocytes Absolute 0 80 Thousand/µL      Eosinophils Absolute 0 14 Thousand/µL      Basophils Absolute 0 04 Thousands/µL                  CT abdomen pelvis with contrast   Final Result by Laura Brito MD (11/15 9701)      3 mm left ureteral pelvis calculus without hydronephrosis  Mild left proximal ureteral wall thickening could relate to infection, correlate with urinalysis  Workstation performed: [id number]                    Procedures  Procedures       ED Course                               MDM  Number of Diagnoses or Management Options  Kidney stone: new and requires workup  Diagnosis management comments: Generally well appearing  Nontoxic  Afebrile and hemodynamically stable  Patient has large blood in her urine, negative nitrites, patient denies dysuria or frequency, doubt UTI  CBC and CMP are unremarkable, with normal renal function    Abdomen overall benign, with mild tenderness to deep palpation in the left lower quadrant  CT abdomen pelvis obtained that shows a 3 mm left ureteral pelvic calculus without hydronephrosis  Suspect that this is the cause of the patient's hematuria and pain  She does not appear to be in significant distress due to pain in the emergency department, but will prescribe small amount of Percocet as well as Flomax  Recommend follow-up with Urology within 1 week for further workup of her kidney stone  Patient has had kidney stones and required lithotripsy in the past   Return precautions discussed for inability to urinate, fevers, uncontrolled pain,dysuria, or vomiting with inability to tolerate oral intake  Patient discharged in good condition with Urology follow-up  Amount and/or Complexity of Data Reviewed  Clinical lab tests: ordered and reviewed  Tests in the radiology section of CPT®: ordered and reviewed  Review and summarize past medical records: yes  Independent visualization of images, tracings, or specimens: yes    Patient Progress  Patient progress: stable           Disposition  Final diagnoses:   Kidney stone     Time reflects when diagnosis was documented in both MDM as applicable and the Disposition within this note     Time User Action Codes Description Comment    11/15/2019  5:48 PM Terry Bashir Add [N20 0] Kidney stone       ED Disposition     ED Disposition Condition Date/Time Comment    Discharge Stable Fri Nov 15, 2019  5:48 PM [address] [facility] discharge to home/self care  Follow-up Information     Follow up With Specialties Details Why Contact Info Additional [address] [facility] In 1 week For further evaluation of the blood in your urine and your kidney stone   [address] [facility] [address]  70  [facility], [address], [place], [place], [facility] Emergency Department Emergency Medicine  As we discussed, return to the Emergency Department for uncontrolled pain, vomiting with inability to tolerate oral intake, fever, or burning with urination   [facility] [address]  [address] [place] [address], [address] [place], [place], [address]          Discharge Medication List as of 11/15/2019  5:51 PM      START taking these medications    Details   oxyCODONE-acetaminophen (PERCOCET) 5-325 mg per tablet Take 1 tablet by mouth every 6 (six) hours as needed for severe pain for up to 5 daysMax Daily Amount: 4 tablets, Starting Fri 11/15/2019, Until Wed 11/20/2019, Normal      tamsulosin (FLOMAX) 0 4 mg Take 1 capsule (0 4 mg total) by mouth daily with dinner for 7 days, Starting Fri 11/15/2019, Until Fri 11/22/2019, Normal         CONTINUE these medications which have NOT CHANGED    Details   AMLODIPINE BESYLATE PO Take 10 mg by mouth daily , Historical Med      aspirin 81 mg chewable tablet Chew 1 tablet (81 mg total) daily, Starting Sun 4/14/2019, Normal      atorvastatin (LIPITOR) 40 mg tablet Take 1 tablet (40 mg total) by mouth daily with dinner, Starting Sat 4/13/2019, Normal      escitalopram (LEXAPRO) 10 mg tablet Take 10 mg by mouth daily, Historical Med      gabapentin (NEURONTIN) 100 mg capsule Take 1 capsule (100 mg total) by mouth daily at bedtime, Starting Mon 5/13/2019, Normal      loperamide (IMODIUM) 2 mg capsule Take 2 mg by mouth as needed for diarrhea, Historical Med      loratadine (CLARITIN) 10 mg tablet Take 1 tablet by mouth daily, Starting Mon 11/13/2017, Print      metFORMIN (GLUCOPHAGE) 500 mg tablet Take 500 mg by mouth 2 (two) times a day with meals, Historical Med      omega-3-acid ethyl esters (LOVAZA) 1 g capsule Take 2 g by mouth 2 (two) times a day, Starting Fri 3/22/2019, Historical Med      PROAIR  (90 Base) MCG/ACT inhaler Inhale 2 puffs every 4 (four) hours as needed for wheezing, Starting Fri 4/20/2018, Historical Med           No discharge procedures on file      ED Provider  Electronically Signed by           Mell Barnhart MD  11/16/19 9605

## 2019-12-30 ENCOUNTER — TELEPHONE (OUTPATIENT)
Dept: NEUROLOGY | Facility: CLINIC | Age: 59
End: 2019-12-30

## 2020-01-03 ENCOUNTER — TELEPHONE (OUTPATIENT)
Dept: UROLOGY | Facility: MEDICAL CENTER | Age: 60
End: 2020-01-03

## 2020-01-03 NOTE — TELEPHONE ENCOUNTER
Called and spoke to patient scheduled her for next available new patient appointment for non-obstructing stones  Patient will have records from previous urologist sent over

## 2020-01-03 NOTE — TELEPHONE ENCOUNTER
PLEASE TRIAGE - ER Follow up  New Patient - Lisa    What is the reason for the patients appointment? Kidney Stone  Seen at [address] on 11/15  CT in Epic under Imaging    Do we accept the patient's insurance or is the patient Self-Pay? Provider: [facility]      Plan: PLAN [id number]    Member ID: [id number]    Group ID: [id number]   Subscriber:  : [address]      Has the patient had any previous urologist(s)? [facility]    Have patient records been requested? Yes, being faxed to [phone]    Has the patient had any outside testing done? No    What is the patients location preference for an office visit? [place]      Does the patient have a personal history of cancer?   Yes, soft tissue cancer    Patient can be reached at: [phone]

## 2020-01-06 ENCOUNTER — CONSULT (OUTPATIENT)
Dept: NEUROLOGY | Facility: CLINIC | Age: 60
End: 2020-01-06
Payer: COMMERCIAL

## 2020-01-06 VITALS
HEIGHT: 64 IN | BODY MASS INDEX: 33.12 KG/M2 | DIASTOLIC BLOOD PRESSURE: 80 MMHG | SYSTOLIC BLOOD PRESSURE: 142 MMHG | HEART RATE: 82 BPM | WEIGHT: 194 LBS

## 2020-01-06 DIAGNOSIS — R25.1 TREMOR: ICD-10-CM

## 2020-01-06 DIAGNOSIS — G25.0 ESSENTIAL TREMOR: Primary | ICD-10-CM

## 2020-01-06 DIAGNOSIS — I72.0 CAROTID ARTERY ANEURYSM (HCC): ICD-10-CM

## 2020-01-06 PROCEDURE — 99215 OFFICE O/P EST HI 40 MIN: CPT | Performed by: PSYCHIATRY & NEUROLOGY

## 2020-01-06 RX ORDER — GABAPENTIN 100 MG/1
100 CAPSULE ORAL 3 TIMES DAILY
Qty: 360 CAPSULE | Refills: 3 | Status: SHIPPED | OUTPATIENT
Start: 2020-01-06 | End: 2020-05-08 | Stop reason: ALTCHOICE

## 2020-01-06 NOTE — ASSESSMENT & PLAN NOTE
32-year-old right-handed woman presents for evaluation of tremor which began in her 25s  I agree with the diagnosis of essential tremor specially given the positive family history and slow progression of the disease over decades  Patient was previously given a prescription for gabapentin which would be reasonable starting point for treatment of essential tremor  I gave her a titration schedule to increase to 400 mg 3 times a day  Medication could be increased further if needed and tolerated  - Primidone or propranolol would be reasonable next choices  - PCP could consider increasing her Lexapro dose given significant anxiety     - return to movement clinic as needed

## 2020-01-06 NOTE — PROGRESS NOTES
Assessment/Plan:    Essential tremor  56-year-old right-handed woman presents for evaluation of tremor which began in her 25s  I agree with the diagnosis of essential tremor specially given the positive family history and slow progression of the disease over decades  Patient was previously given a prescription for gabapentin which would be reasonable starting point for treatment of essential tremor  I gave her a titration schedule to increase to 400 mg 3 times a day  Medication could be increased further if needed and tolerated  - Primidone or propranolol would be reasonable next choices  - PCP could consider increasing her Lexapro dose given significant anxiety  - return to movement clinic as needed     Carotid artery aneurysm ([facility] [address] )  Agree with ongoing follow up with vascular surgery       Diagnoses and all orders for this visit:    Essential tremor    Tremor  -     gabapentin (NEURONTIN) 100 mg capsule; Take 1 capsule (100 mg total) by mouth 3 (three) times a day Start with 1 capsule 3 times per day and increase as dir    Carotid artery aneurysm ([facility] [address] )        I have spent 45 minutes with Patient  today in which greater than 50% of this time was spent in counseling/coordination of care regarding Prognosis, Risks and benefits of tx options, Patient and family education and Impressions  Subjective:     Patient ID: Madhu Ramos is a 64 y o  female  I had the pleasure of seeing your patient, Madhu Ramos in the Movement Disorders Clinic at the [facility]  The patient is a 64 y o  right handed female who presents for evaluation of tremor  The patient reports that her tremor started in her 25s  She 1st noticed the tremor in her hands they later spread to her head, voice and then the rest of her body  The tremors are very bothersome  She enjoys crafting and she has difficulty writing, painting, sewing picking up small objects    She has to drink liquids using 2 hands unless the cup has a lid  She has been evaluated by 2 neurologists in the past most recently Dr Jacquline Cabot  She thinks she was placed on 1 medicine in the past but does not remember what it was  Dr Jacquline Cabot prescribed gabapentin but she never took it  She describes getting disappointed and depressed regarding her tremor and health in general     The patient's sister, brother and paternal grandfather all had similar tremors  Regarding motor symptoms:   Tremor: everywhere   Slowness: a little   Stiffness: no   Dystonia: no   Changes in voice: tremor   Changes in writing: messy   Changes in gait: mild balance troubles  Falls: broke her wrist   Freezing: no  Trouble with swallowing: "some times I have trouble eating, I just panic and things get worse"  Has ENT follow up scheduled  Regarding non-motor symptoms:   Anosmia: no   Constipation: no   REM behavior Disorder: no     Coffee: 1 cup per day   Alcohol: rare  No clear change on tremor     The following portions of the patient's history were reviewed and updated as appropriate: allergies, current medications, past family history, past medical history, past social history, past surgical history and problem list     Patient is currently unemployed  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  Objective:  /80 (BP Location: Right arm, Patient Position: Standing, Cuff Size: Standard)   Pulse 82   Ht 5' 4" (1 626 m)   Wt 88 kg (194 lb)   BMI 33 30 kg/m²     Physical Exam    Neurological Exam     GENERAL MEDICAL EXAMINATION:  General appearance: alert, in no apparent distress  Appropriately dressed and groomed  Conversing and interacting appropriately  Eyes: Sclera are non-injected  Ears, nose, Mouth, Throat: Mucous membranes are moist    Resp: Breathing comfortably on RA   Musculoskeletal: No evidence of deformities  No contractures  No Edema  Skin: No visible rashes  Warm and well perfused  Psych: normal and appropriate affect     Mental Status:  Alert and oriented to person place and time  Able to relate history without difficulty  Attentive to conversation  Language is fluent and appropriate with normal prosody  There were no paraphasic errors  Speech was not dysarthric  Able to follow both midline and appendicular commands  General Neurology examination:   PERRL, EOMI, Face is symmetric at rest  Normal bulk and tone throughout  Patient moves all 4 extremities equally and antigravity  No pronator drift  There were no adventitious movements noted  Normal sensation to light touch and temperature in all 4 extremities  Finger to nose without dysmetria bilaterally  No intention tremor  NIDHI were accurate and symmetric with regard to hayde and speed  Slower activation of the right face  Head Tremor: mild and somewhat irregular  Left shoulder is up  Face Tremor: 0  Voice Tremor: in casual conversation   Upper limb tremor       R outstretched posture: 1       L outstretched posture: 1       R Wing Beatin       L Wing Beatin       R Kinetic:2        L Kinetic: 2 L>R       R rest: 0       L rest: 0  Lower limb tremor:        R: 1       L: 1  Standing tremor: 0  Tone:       R: 0       L: 0  Bradykinesia:       R: 0       L: 0     Gait:   Able to rise from a chair without the use of arms  Posture was normal  Narrow-base and stable stance  Normal initiation  Normal stride length, step height, arm swing  Turn completed in 2 steps  Romberg is negative  No truncal ataxia  Review of Systems   Constitutional: Positive for appetite change, chills, fatigue and unexpected weight change  Negative for fever  HENT: Positive for congestion, sinus pressure, sinus pain, sore throat and trouble swallowing  Negative for hearing loss, tinnitus and voice change  Eyes: Negative  Negative for photophobia and pain     Respiratory: Negative  Negative for shortness of breath  Cardiovascular: Positive for palpitations  Gastrointestinal: Positive for abdominal pain, diarrhea and nausea  Negative for vomiting  Loss of bowel control   Endocrine: Negative  Negative for cold intolerance and heat intolerance  Genitourinary: Negative  Negative for dysuria, frequency and urgency  Loss of bladder control     Musculoskeletal: Positive for back pain, gait problem (pain while walking and balance problems), myalgias and neck pain  Skin: Positive for rash  Neurological: Positive for dizziness, tremors (everywhere), light-headedness, numbness and headaches  Negative for seizures, syncope, facial asymmetry, speech difficulty and weakness  Memory problems  Tingling     Hematological: Bruises/bleeds easily  Psychiatric/Behavioral: Positive for confusion and sleep disturbance (snoring)  Negative for hallucinations  The patient is nervous/anxious  Depression     The above ROS was reviewed and updated       Samreen Jiménez MD  Medical Director   [facility]  [facility]ment [facility] Memory Specialist       Current Outpatient Medications on File Prior to Visit   Medication Sig Dispense Refill    AMLODIPINE BESYLATE PO Take 10 mg by mouth daily       aspirin 81 mg chewable tablet Chew 1 tablet (81 mg total) daily 30 tablet 0    atorvastatin (LIPITOR) 40 mg tablet Take 1 tablet (40 mg total) by mouth daily with dinner 30 tablet 0    escitalopram (LEXAPRO) 10 mg tablet Take 10 mg by mouth daily      loperamide (IMODIUM) 2 mg capsule Take 2 mg by mouth as needed for diarrhea      loratadine (CLARITIN) 10 mg tablet Take 1 tablet by mouth daily 20 tablet 0    metFORMIN (GLUCOPHAGE) 500 mg tablet Take 500 mg by mouth 2 (two) times a day with meals      omega-3-acid ethyl esters (LOVAZA) 1 g capsule Take 2 g by mouth 2 (two) times a day  3    PROAIR  (90 Base) MCG/ACT inhaler Inhale 2 puffs every 4 (four) hours as needed for wheezing  3    [DISCONTINUED] gabapentin (NEURONTIN) 100 mg capsule Take 1 capsule (100 mg total) by mouth daily at bedtime 30 capsule 5    tamsulosin (FLOMAX) 0 4 mg Take 1 capsule (0 4 mg total) by mouth daily with dinner for 7 days 7 capsule 0     No current facility-administered medications on file prior to visit

## 2020-01-06 NOTE — PATIENT INSTRUCTIONS
Gabapentin: 100mg capsules, increase as follows    AM noon PM   Week 1-2 1 1 1   Week 3-4 2 2 2   Week 5-6 3 3 3   Week 7-8 4 4 4

## 2020-05-07 ENCOUNTER — TELEPHONE (OUTPATIENT)
Dept: NEUROLOGY | Facility: CLINIC | Age: 60
End: 2020-05-07

## 2020-05-08 ENCOUNTER — TELEMEDICINE (OUTPATIENT)
Dept: NEUROLOGY | Facility: CLINIC | Age: 60
End: 2020-05-08
Payer: COMMERCIAL

## 2020-05-08 DIAGNOSIS — E78.2 MIXED HYPERLIPIDEMIA: ICD-10-CM

## 2020-05-08 DIAGNOSIS — G62.9 NEUROPATHY: ICD-10-CM

## 2020-05-08 DIAGNOSIS — I10 ESSENTIAL (PRIMARY) HYPERTENSION: ICD-10-CM

## 2020-05-08 DIAGNOSIS — I72.0 CAROTID ARTERY ANEURYSM (HCC): ICD-10-CM

## 2020-05-08 DIAGNOSIS — G25.0 ESSENTIAL TREMOR: Primary | ICD-10-CM

## 2020-05-08 PROCEDURE — 99214 OFFICE O/P EST MOD 30 MIN: CPT | Performed by: PSYCHIATRY & NEUROLOGY

## 2020-05-08 RX ORDER — GABAPENTIN 100 MG/1
100 CAPSULE ORAL 3 TIMES DAILY
Qty: 90 CAPSULE | Refills: 4 | Status: SHIPPED | OUTPATIENT
Start: 2020-05-08 | End: 2020-05-12

## 2020-05-08 RX ORDER — LORAZEPAM 0.5 MG/1
0.5 TABLET ORAL
COMMUNITY
Start: 2020-03-24

## 2020-05-12 DIAGNOSIS — G25.0 ESSENTIAL TREMOR: ICD-10-CM

## 2020-05-12 DIAGNOSIS — G62.9 NEUROPATHY: ICD-10-CM

## 2020-05-12 RX ORDER — GABAPENTIN 100 MG/1
CAPSULE ORAL
Qty: 90 CAPSULE | Refills: 5 | Status: SHIPPED | OUTPATIENT
Start: 2020-05-12

## 2020-05-20 ENCOUNTER — TELEPHONE (OUTPATIENT)
Dept: SURGICAL ONCOLOGY | Facility: CLINIC | Age: 60
End: 2020-05-20

## 2020-05-21 ENCOUNTER — OFFICE VISIT (OUTPATIENT)
Dept: SURGICAL ONCOLOGY | Facility: CLINIC | Age: 60
End: 2020-05-21
Payer: COMMERCIAL

## 2020-05-21 VITALS
BODY MASS INDEX: 32.61 KG/M2 | WEIGHT: 191 LBS | HEIGHT: 64 IN | DIASTOLIC BLOOD PRESSURE: 88 MMHG | SYSTOLIC BLOOD PRESSURE: 130 MMHG | HEART RATE: 83 BPM | TEMPERATURE: 97.7 F

## 2020-05-21 DIAGNOSIS — C44.99 DERMATOFIBROSARCOMA: Primary | ICD-10-CM

## 2020-05-21 PROCEDURE — 99213 OFFICE O/P EST LOW 20 MIN: CPT | Performed by: SURGERY

## 2020-07-29 ENCOUNTER — OFFICE VISIT (OUTPATIENT)
Dept: GASTROENTEROLOGY | Facility: CLINIC | Age: 60
End: 2020-07-29
Payer: COMMERCIAL

## 2020-07-29 ENCOUNTER — PREP FOR PROCEDURE (OUTPATIENT)
Dept: GASTROENTEROLOGY | Facility: CLINIC | Age: 60
End: 2020-07-29

## 2020-07-29 VITALS
SYSTOLIC BLOOD PRESSURE: 132 MMHG | BODY MASS INDEX: 31.28 KG/M2 | WEIGHT: 183.2 LBS | HEART RATE: 87 BPM | TEMPERATURE: 97.5 F | HEIGHT: 64 IN | DIASTOLIC BLOOD PRESSURE: 82 MMHG

## 2020-07-29 DIAGNOSIS — Z86.010 HISTORY OF COLON POLYPS: Primary | ICD-10-CM

## 2020-07-29 DIAGNOSIS — Z86.010 HISTORY OF COLON POLYPS: ICD-10-CM

## 2020-07-29 DIAGNOSIS — K57.90 DIVERTICULAR DISEASE: ICD-10-CM

## 2020-07-29 DIAGNOSIS — R10.13 EPIGASTRIC PAIN: ICD-10-CM

## 2020-07-29 DIAGNOSIS — K76.0 FATTY LIVER: Primary | ICD-10-CM

## 2020-07-29 PROCEDURE — 99214 OFFICE O/P EST MOD 30 MIN: CPT | Performed by: INTERNAL MEDICINE

## 2020-07-29 RX ORDER — PROPRANOLOL HYDROCHLORIDE 10 MG/1
10 TABLET ORAL 2 TIMES DAILY
COMMUNITY
Start: 2020-07-21

## 2020-07-29 RX ORDER — PANTOPRAZOLE SODIUM 40 MG/1
40 TABLET, DELAYED RELEASE ORAL DAILY
Qty: 31 TABLET | Refills: 6 | Status: SHIPPED | OUTPATIENT
Start: 2020-07-29

## 2020-07-29 NOTE — PROGRESS NOTES
[facility] - Outpatient Follow-up Note  Parviz Watson 64 y o  female MRN: [id number]  Encounter: [id number]          ASSESSMENT AND PLAN:      1  Fatty liver  - SUAREZ Fibrosure; Future  - Most likely represents NAFLD and not SUAREZ as the liver enzymes are abnormal  - Two most likely etiologies for NAFLD in this patient is her diabetes and obesity    2  History of colon polyps    3  Diverticular disease    4  Epigastric abdominal pain, burning    Fibrosure testing    Schedule colonoscopy with a bowel prep    Rx Pantoprazole 40 mg daily    In the absence of SUAREZ, I would not routinely recommend Vitamin E therapy  I generally don't prescribe Vitamin E in patients with NAFLD, particularly if their liver enzymes are normal    No indication for liver biopsy    ______________________________________________________________________    SUBJECTIVE:  This 65-year-old female was referred to the office for the evaluation of fatty liver  This fatty liver condition is not a new condition as her CT scan on October 23, 2018 showed a diffuse fatty liver condition along with diverticulosis coli  Patient's most recent liver enzymes on is July 10, 2020 showed a total bilirubin level of 0 6, AST 23, ALT 31, alkaline phosphatase of 73  The patient has a history of both of diabetes mellitus as well as obesity  She states she has been losing weight most recently  She stated that her primary care physician has given her vitamin-E to begin taking  But she has not started this therapy yet  Patient also admits she is experiencing epigastric burning pain without heartburn dysphagia symptoms  This pain radiates around to the back  She is currently not taking a proton pump inhibitor  She denies nausea vomiting and dysphagia  The patient denies any change in the bowel habits  She denies diarrhea or constipation  She denies rectal bleeding, melena, hematemesis, bright red blood per rectum      The patient has a history of colon polyps  She underwent her last colonoscopy on October 19, 2017  She had 2 polyps or identified  One polyp was larger than 1 cm located in the transverse colon this polyp was removed and proved to be a serrated adenoma  She had another polyp in the left colon which also proved to be a tubular adenoma  REVIEW OF SYSTEMS IS OTHERWISE NEGATIVE        Historical Information   Past Medical History:   Diagnosis Date    Asthma     Cancer ([facility] )     Depression     Diabetes insipidus ([facility] )     Diabetes mellitus ([facility] )     Hypertension     Stomach disorder      Past Surgical History:   Procedure Laterality Date    BREAST LUMPECTOMY      CHOLECYSTECTOMY      KIDNEY STONE SURGERY      SKIN GRAFT      TOTAL ABDOMINAL HYSTERECTOMY       Social History   Social History     Substance and Sexual Activity   Alcohol Use Not Currently     Social History     Substance and Sexual Activity   Drug Use No     Social History     Tobacco Use   Smoking Status Current Every Day Smoker    Packs/day: 0 20   Smokeless Tobacco Never Used     Family History   Problem Relation Age of Onset    Heart attack Mother     Lung cancer Mother 61    Kidney cancer Father 39    Stomach cancer Maternal Grandmother         79-80       Meds/Allergies       Current Outpatient Medications:     AMLODIPINE BESYLATE PO    aspirin 81 mg chewable tablet    atorvastatin (LIPITOR) 40 mg tablet    escitalopram (LEXAPRO) 10 mg tablet    loperamide (IMODIUM) 2 mg capsule    loratadine (CLARITIN) 10 mg tablet    LORazepam (ATIVAN) 0 5 mg tablet    metFORMIN (GLUCOPHAGE) 500 mg tablet    omega-3-acid ethyl esters (LOVAZA) 1 g capsule    PROAIR  (90 Base) MCG/ACT inhaler    propranolol (INDERAL) 10 mg tablet    gabapentin (NEURONTIN) 100 mg capsule    tamsulosin (FLOMAX) 0 4 mg    Allergies   Allergen Reactions    Gabapentin Other (See Comments)     Depression    Latex Itching           Objective     Blood pressure 132/82, pulse 87, temperature 97 5 °F (36 4 °C), temperature source Temporal, height 5' 4" (1 626 m), weight 83 1 kg (183 lb 3 2 oz)  Body mass index is 31 45 kg/m²  PHYSICAL EXAM:      General Appearance:   Alert, cooperative, no distress, obese   HEENT:   Normocephalic, atraumatic, anicteric      Neck:  Supple, symmetrical, trachea midline   Lungs:   Clear to auscultation bilaterally; no rales, rhonchi or wheezing; respirations unlabored    Heart[de-identified]   Regular rate and rhythm; no murmur, rub, or gallop  Abdomen:   Soft, tender in the epigastrium, non-distended; normal bowel sounds; no masses, no organomegaly    Genitalia:   Deferred    Rectal:   Deferred    Extremities:  No cyanosis, clubbing or edema    Pulses:  2+ and symmetric    Skin:  No jaundice, rashes, or lesions    Lymph nodes:  No palpable cervical lymphadenopathy        Lab Results:   No visits with results within 1 Day(s) from this visit     Latest known visit with results is:   Admission on 11/15/2019, Discharged on 11/15/2019   Component Date Value    Color, UA 11/15/2019 Yellow     Clarity, UA 11/15/2019 Cloudy     Specific Gravity, UA 11/15/2019 1 010     pH, UA 11/15/2019 6 0     Leukocytes, UA 11/15/2019 Negative     Nitrite, UA 11/15/2019 Negative     Protein, UA 11/15/2019 30 (1+)*    Glucose, UA 11/15/2019 Negative     Ketones, UA 11/15/2019 Negative     Urobilinogen, UA 11/15/2019 0 2     Bilirubin, UA 11/15/2019 Negative     Blood, UA 11/15/2019 Large*    WBC 11/15/2019 8 89     RBC 11/15/2019 5 04     Hemoglobin 11/15/2019 14 1     Hematocrit 11/15/2019 43 1     MCV 11/15/2019 86     MCH 11/15/2019 28 0     MCHC 11/15/2019 32 7     RDW 11/15/2019 13 3     MPV 11/15/2019 11 9     Platelets 62/69/0878 211     nRBC 11/15/2019 0     Neutrophils Relative 11/15/2019 53     Immat GRANS % 11/15/2019 0     Lymphocytes Relative 11/15/2019 36     Monocytes Relative 11/15/2019 9     Eosinophils Relative 11/15/2019 2     Basophils Relative 11/15/2019 0     Neutrophils Absolute 11/15/2019 4 71     Immature Grans Absolute 11/15/2019 0 03     Lymphocytes Absolute 11/15/2019 3 17     Monocytes Absolute 11/15/2019 0 80     Eosinophils Absolute 11/15/2019 0 14     Basophils Absolute 11/15/2019 0 04     Sodium 11/15/2019 138     Potassium 11/15/2019 3 7     Chloride 11/15/2019 102     CO2 11/15/2019 25     ANION GAP 11/15/2019 11     BUN 11/15/2019 11     Creatinine 11/15/2019 0 67     Glucose 11/15/2019 95     Calcium 11/15/2019 9 3     AST 11/15/2019 27     ALT 11/15/2019 36     Alkaline Phosphatase 11/15/2019 85     Total Protein 11/15/2019 7 7     Albumin 11/15/2019 4 1     Total Bilirubin 11/15/2019 0 60     eGFR 11/15/2019 98     Lipase 11/15/2019 110     RBC, UA 11/15/2019 Innumerable*    WBC, UA 11/15/2019 None Seen     Epithelial Cells 11/15/2019 Occasional     Bacteria, UA 11/15/2019 Occasional          Radiology Results:   No results found

## 2020-08-13 ENCOUNTER — ANESTHESIA (OUTPATIENT)
Dept: GASTROENTEROLOGY | Facility: HOSPITAL | Age: 60
End: 2020-08-13

## 2020-08-13 ENCOUNTER — HOSPITAL ENCOUNTER (OUTPATIENT)
Dept: GASTROENTEROLOGY | Facility: HOSPITAL | Age: 60
Setting detail: OUTPATIENT SURGERY
Discharge: HOME | End: 2020-08-13
Attending: INTERNAL MEDICINE | Admitting: INTERNAL MEDICINE
Payer: COMMERCIAL

## 2020-08-13 ENCOUNTER — ANESTHESIA EVENT (OUTPATIENT)
Dept: GASTROENTEROLOGY | Facility: HOSPITAL | Age: 60
End: 2020-08-13

## 2020-08-13 VITALS
BODY MASS INDEX: 30.9 KG/M2 | OXYGEN SATURATION: 100 % | HEART RATE: 66 BPM | SYSTOLIC BLOOD PRESSURE: 141 MMHG | DIASTOLIC BLOOD PRESSURE: 70 MMHG | RESPIRATION RATE: 21 BRPM | TEMPERATURE: 97.7 F | WEIGHT: 181 LBS | HEIGHT: 64 IN

## 2020-08-13 DIAGNOSIS — Z86.010 HISTORY OF COLON POLYPS: ICD-10-CM

## 2020-08-13 DIAGNOSIS — R10.13 EPIGASTRIC PAIN: ICD-10-CM

## 2020-08-13 LAB — GLUCOSE SERPL-MCNC: 115 MG/DL (ref 65–140)

## 2020-08-13 PROCEDURE — G0121 COLON CA SCRN NOT HI RSK IND: HCPCS | Performed by: INTERNAL MEDICINE

## 2020-08-13 PROCEDURE — 43239 EGD BIOPSY SINGLE/MULTIPLE: CPT | Performed by: INTERNAL MEDICINE

## 2020-08-13 PROCEDURE — 88342 IMHCHEM/IMCYTCHM 1ST ANTB: CPT | Performed by: PATHOLOGY

## 2020-08-13 PROCEDURE — 88305 TISSUE EXAM BY PATHOLOGIST: CPT | Performed by: PATHOLOGY

## 2020-08-13 PROCEDURE — 82948 REAGENT STRIP/BLOOD GLUCOSE: CPT

## 2020-08-13 RX ORDER — SODIUM CHLORIDE, SODIUM LACTATE, POTASSIUM CHLORIDE, CALCIUM CHLORIDE 600; 310; 30; 20 MG/100ML; MG/100ML; MG/100ML; MG/100ML
INJECTION, SOLUTION INTRAVENOUS CONTINUOUS PRN
Status: DISCONTINUED | OUTPATIENT
Start: 2020-08-13 | End: 2020-08-13

## 2020-08-13 RX ORDER — LIDOCAINE HYDROCHLORIDE 10 MG/ML
INJECTION, SOLUTION EPIDURAL; INFILTRATION; INTRACAUDAL; PERINEURAL AS NEEDED
Status: DISCONTINUED | OUTPATIENT
Start: 2020-08-13 | End: 2020-08-13

## 2020-08-13 RX ORDER — PROPOFOL 10 MG/ML
INJECTION, EMULSION INTRAVENOUS AS NEEDED
Status: DISCONTINUED | OUTPATIENT
Start: 2020-08-13 | End: 2020-08-13

## 2020-08-13 RX ADMIN — PROPOFOL 30 MG: 10 INJECTION, EMULSION INTRAVENOUS at 09:06

## 2020-08-13 RX ADMIN — PROPOFOL 20 MG: 10 INJECTION, EMULSION INTRAVENOUS at 08:57

## 2020-08-13 RX ADMIN — SODIUM CHLORIDE, SODIUM LACTATE, POTASSIUM CHLORIDE, AND CALCIUM CHLORIDE: .6; .31; .03; .02 INJECTION, SOLUTION INTRAVENOUS at 08:40

## 2020-08-13 RX ADMIN — LIDOCAINE HYDROCHLORIDE 50 MG: 10 INJECTION, SOLUTION EPIDURAL; INFILTRATION; INTRACAUDAL; PERINEURAL at 08:53

## 2020-08-13 RX ADMIN — PROPOFOL 20 MG: 10 INJECTION, EMULSION INTRAVENOUS at 09:11

## 2020-08-13 RX ADMIN — PROPOFOL 50 MG: 10 INJECTION, EMULSION INTRAVENOUS at 08:54

## 2020-08-13 RX ADMIN — PROPOFOL 100 MG: 10 INJECTION, EMULSION INTRAVENOUS at 08:53

## 2020-08-13 RX ADMIN — PROPOFOL 30 MG: 10 INJECTION, EMULSION INTRAVENOUS at 09:04

## 2020-08-13 RX ADMIN — PROPOFOL 30 MG: 10 INJECTION, EMULSION INTRAVENOUS at 09:02

## 2020-08-13 NOTE — ANESTHESIA POSTPROCEDURE EVALUATION
Post-Op Assessment Note    CV Status:  Stable  Pain Score: 0    Pain management: adequate     Mental Status:  Awake and sleepy   Hydration Status:  Stable   PONV Controlled:  None   Airway Patency:  Patent and adequate      Post Op Vitals Reviewed: Yes      Staff: CRNA         No complications documented      /66 (08/13/20 0919)    Temp 97 7 °F (36 5 °C) (08/13/20 0919)    Pulse 70 (08/13/20 0919)   Resp 19 (08/13/20 0919)    SpO2 99 % (08/13/20 0919)

## 2020-08-13 NOTE — ANESTHESIA PREPROCEDURE EVALUATION
Procedure:  COLONOSCOPY  EGD    Relevant Problems   ANESTHESIA   (-) History of unintended awareness under general anesthesia      CARDIO   (+) Essential (primary) hypertension   (+) Hyperlipidemia   (-) Chest pain   (-) VEGA (dyspnea on exertion)      ENDO   (+) Type 2 diabetes mellitus without complication (HCC)      MUSCULOSKELETAL   (+) Lyme arthritis (HCC)      NEURO/PSYCH   (+) Anxiety      PULMONARY   (+) Allergic asthma        Physical Exam    Airway    Mallampati score: II  TM Distance: >3 FB  Neck ROM: full     Dental       Cardiovascular      Pulmonary      Other Findings        Anesthesia Plan  ASA Score- 2     Anesthesia Type- IV sedation with anesthesia with ASA Monitors  Additional Monitors:   Airway Plan:           Plan Factors-Exercise tolerance (METS): >4 METS  Chart reviewed  Existing labs reviewed  Patient is not a current smoker  Patient did not smoke on day of surgery  Induction- intravenous  Postoperative Plan-     Informed Consent- Anesthetic plan and risks discussed with patient  I personally reviewed this patient with the CRNA  Discussed and agreed on the Anesthesia Plan with the KEVIN Floyd

## 2020-08-13 NOTE — H&P
History and Physical -  [facility]  Lynsey Klein 64 y o  female MRN: [id number]      HPI: Lynsey Klein is a 64y o  year old female who presents for epigastric abdominal pain and a prior history of colon polyps      REVIEW OF SYSTEMS: Per the HPI, and otherwise unremarkable  Historical Information   Past Medical History:   Diagnosis Date    Asthma     Cancer ([facility] [address] )     soft tissue    Colon polyp     Depression     Diabetes insipidus ([facility] [address]     [facility] )     Hypertension     Stomach disorder     Tremors of nervous system      Past Surgical History:   Procedure Laterality Date    BREAST LUMPECTOMY      CHOLECYSTECTOMY      COLONOSCOPY      KIDNEY STONE SURGERY      SKIN GRAFT      TOTAL ABDOMINAL HYSTERECTOMY       Social History   Social History     Substance and Sexual Activity   Alcohol Use Not Currently     Social History     Substance and Sexual Activity   Drug Use No     Social History     Tobacco Use   Smoking Status Current Every Day Smoker    Packs/day: 0 20   Smokeless Tobacco Never Used     Family History   Problem Relation Age of Onset    Heart attack Mother     Lung cancer Mother 61    Kidney cancer Father 39    Stomach cancer Maternal Grandmother         79-80       Meds/Allergies     (Not in a hospital admission)      Allergies   Allergen Reactions    Gabapentin Other (See Comments)     Depression    Latex Itching       Objective     Blood pressure 141/63, pulse 75, temperature 98 1 °F (36 7 °C), temperature source Temporal, resp  rate 17, height 5' 4" (1 626 m), weight 82 1 kg (181 lb), SpO2 99 %  PHYSICAL EXAM    Gen: NAD  CV: RRR  CHEST: Clear  ABD: soft, NT/ND  EXT: no edema      ASSESSMENT/PLAN:  This is a 64y o  year old female here for esophagogastroduodenoscopy with biopsies, colonoscopy, and she is stable and optimized for her procedure

## 2020-08-13 NOTE — DISCHARGE INSTRUCTIONS
Colonoscopy   WHAT YOU NEED TO KNOW:   A colonoscopy is a procedure to examine the inside of your colon (intestine) with a scope  Polyps or tissue growths may have been removed during your colonoscopy  It is normal to feel bloated and to have some abdominal discomfort  You should be passing gas  If you have hemorrhoids or you had polyps removed, you may have a small amount of bleeding  DISCHARGE INSTRUCTIONS:   Seek care immediately if:   · You have a large amount of bright red blood in your bowel movements  · Your abdomen is hard and firm and you have severe pain  · You have sudden trouble breathing  Contact your healthcare provider if:   · You develop a rash or hives  · You have a fever within 24 hours of your procedure       · You have not had a bowel movement for 3 days after your procedure  · You have questions or concerns about your condition or care  Activity:   · Do not lift, strain, or run  for 3 days after your procedure  · Rest after your procedure  You have been given medicine to relax you  Do not  drive or make important decisions until the day after your procedure  Return to your normal activity as directed  · Relieve gas and discomfort from bloating  by lying on your right side with a heating pad on your abdomen  You may need to take short walks to help the gas move out  Eat small meals until bloating is relieved  If you had polyps removed: For 7 days after your procedure:  · Do not  take aspirin  · Do not  go on long car rides  Follow up with your healthcare provider as directed:  Write down your questions so you remember to ask them during your visits  © 2017 [address] [employer] is for End User's use only and may not be sold, redistributed or otherwise used for commercial purposes  All illustrations and images included in CareNotes® are the copyrighted property of [employer]  or [employer]    The above information is an  only  It is not intended as medical advice for individual conditions or treatments  Talk to your doctor, nurse or pharmacist before following any medical regimen to see if it is safe and effective for you

## 2020-08-19 ENCOUNTER — TELEPHONE (OUTPATIENT)
Dept: GASTROENTEROLOGY | Facility: CLINIC | Age: 60
End: 2020-08-19

## 2020-08-19 NOTE — TELEPHONE ENCOUNTER
----- Message from Adrienne Simmons DO sent at 8/19/2020  7:35 AM EDT -----  Please call the patient and reassured the patient that the biopsies the stomach were benign and negative for Helicobacter pylori

## 2020-08-19 NOTE — TELEPHONE ENCOUNTER
----- Message from Kimi Sams DO sent at 8/19/2020 10:33 AM EDT -----  The biopsies have been reviewed  The biopsies of the stomach were benign  There is no evidence of Helicobacter pylori  Please call the patient and make them aware

## 2020-08-24 ENCOUNTER — TELEPHONE (OUTPATIENT)
Dept: GASTROENTEROLOGY | Facility: CLINIC | Age: 60
End: 2020-08-24

## 2020-11-25 ENCOUNTER — HOSPITAL ENCOUNTER (EMERGENCY)
Facility: HOSPITAL | Age: 60
Discharge: HOME | End: 2020-11-26
Attending: EMERGENCY MEDICINE | Admitting: EMERGENCY MEDICINE
Payer: COMMERCIAL

## 2020-11-25 ENCOUNTER — APPOINTMENT (EMERGENCY)
Dept: CT IMAGING | Facility: HOSPITAL | Age: 60
End: 2020-11-25
Payer: COMMERCIAL

## 2020-11-25 ENCOUNTER — APPOINTMENT (EMERGENCY)
Dept: RADIOLOGY | Facility: HOSPITAL | Age: 60
End: 2020-11-25
Payer: COMMERCIAL

## 2020-11-25 DIAGNOSIS — R07.9 CHEST PAIN, UNSPECIFIED TYPE: Primary | ICD-10-CM

## 2020-11-25 LAB
ALBUMIN SERPL BCP-MCNC: 3.9 G/DL (ref 3.5–5)
ALP SERPL-CCNC: 81 U/L (ref 46–116)
ALT SERPL W P-5'-P-CCNC: 32 U/L (ref 12–78)
ANION GAP SERPL CALCULATED.3IONS-SCNC: 10 MMOL/L (ref 4–13)
AST SERPL W P-5'-P-CCNC: 21 U/L (ref 5–45)
BASOPHILS # BLD AUTO: 0.04 THOUSANDS/ΜL (ref 0–0.1)
BASOPHILS NFR BLD AUTO: 0 % (ref 0–1)
BILIRUB SERPL-MCNC: 0.4 MG/DL (ref 0.2–1)
BUN SERPL-MCNC: 16 MG/DL (ref 5–25)
CALCIUM SERPL-MCNC: 9.6 MG/DL (ref 8.3–10.1)
CHLORIDE SERPL-SCNC: 104 MMOL/L (ref 100–108)
CO2 SERPL-SCNC: 27 MMOL/L (ref 21–32)
CREAT SERPL-MCNC: 0.87 MG/DL (ref 0.6–1.3)
EOSINOPHIL # BLD AUTO: 0.19 THOUSAND/ΜL (ref 0–0.61)
EOSINOPHIL NFR BLD AUTO: 2 % (ref 0–6)
ERYTHROCYTE [DISTWIDTH] IN BLOOD BY AUTOMATED COUNT: 13.2 % (ref 11.6–15.1)
GFR SERPL CREATININE-BSD FRML MDRD: 74 ML/MIN/1.73SQ M
GLUCOSE SERPL-MCNC: 123 MG/DL (ref 65–140)
HCT VFR BLD AUTO: 42 % (ref 34.8–46.1)
HGB BLD-MCNC: 14 G/DL (ref 11.5–15.4)
IMM GRANULOCYTES # BLD AUTO: 0.02 THOUSAND/UL (ref 0–0.2)
IMM GRANULOCYTES NFR BLD AUTO: 0 % (ref 0–2)
LYMPHOCYTES # BLD AUTO: 3.76 THOUSANDS/ΜL (ref 0.6–4.47)
LYMPHOCYTES NFR BLD AUTO: 36 % (ref 14–44)
MCH RBC QN AUTO: 28.5 PG (ref 26.8–34.3)
MCHC RBC AUTO-ENTMCNC: 33.3 G/DL (ref 31.4–37.4)
MCV RBC AUTO: 86 FL (ref 82–98)
MONOCYTES # BLD AUTO: 0.91 THOUSAND/ΜL (ref 0.17–1.22)
MONOCYTES NFR BLD AUTO: 9 % (ref 4–12)
NEUTROPHILS # BLD AUTO: 5.47 THOUSANDS/ΜL (ref 1.85–7.62)
NEUTS SEG NFR BLD AUTO: 53 % (ref 43–75)
NRBC BLD AUTO-RTO: 0 /100 WBCS
PLATELET # BLD AUTO: 239 THOUSANDS/UL (ref 149–390)
PMV BLD AUTO: 12 FL (ref 8.9–12.7)
POTASSIUM SERPL-SCNC: 3.6 MMOL/L (ref 3.5–5.3)
PROT SERPL-MCNC: 7.9 G/DL (ref 6.4–8.2)
RBC # BLD AUTO: 4.91 MILLION/UL (ref 3.81–5.12)
SODIUM SERPL-SCNC: 141 MMOL/L (ref 136–145)
TROPONIN I SERPL-MCNC: <0.02 NG/ML
WBC # BLD AUTO: 10.39 THOUSAND/UL (ref 4.31–10.16)

## 2020-11-25 PROCEDURE — 80053 COMPREHEN METABOLIC PANEL: CPT | Performed by: EMERGENCY MEDICINE

## 2020-11-25 PROCEDURE — 99285 EMERGENCY DEPT VISIT HI MDM: CPT

## 2020-11-25 PROCEDURE — 84484 ASSAY OF TROPONIN QUANT: CPT | Performed by: EMERGENCY MEDICINE

## 2020-11-25 PROCEDURE — 74174 CTA ABD&PLVS W/CONTRAST: CPT

## 2020-11-25 PROCEDURE — 85025 COMPLETE CBC W/AUTO DIFF WBC: CPT | Performed by: EMERGENCY MEDICINE

## 2020-11-25 PROCEDURE — 71046 X-RAY EXAM CHEST 2 VIEWS: CPT

## 2020-11-25 PROCEDURE — G1004 CDSM NDSC: HCPCS

## 2020-11-25 PROCEDURE — 71275 CT ANGIOGRAPHY CHEST: CPT

## 2020-11-25 PROCEDURE — 36415 COLL VENOUS BLD VENIPUNCTURE: CPT | Performed by: EMERGENCY MEDICINE

## 2020-11-25 PROCEDURE — 99285 EMERGENCY DEPT VISIT HI MDM: CPT | Performed by: EMERGENCY MEDICINE

## 2020-11-25 PROCEDURE — 93005 ELECTROCARDIOGRAM TRACING: CPT

## 2020-11-25 RX ORDER — ACETAMINOPHEN 325 MG/1
975 TABLET ORAL ONCE
Status: COMPLETED | OUTPATIENT
Start: 2020-11-25 | End: 2020-11-25

## 2020-11-25 RX ADMIN — IOHEXOL 100 ML: 350 INJECTION, SOLUTION INTRAVENOUS at 22:47

## 2020-11-25 RX ADMIN — ACETAMINOPHEN 975 MG: 325 TABLET, FILM COATED ORAL at 21:59

## 2020-11-26 VITALS
RESPIRATION RATE: 17 BRPM | SYSTOLIC BLOOD PRESSURE: 153 MMHG | WEIGHT: 189.82 LBS | TEMPERATURE: 97.3 F | BODY MASS INDEX: 32.58 KG/M2 | OXYGEN SATURATION: 98 % | DIASTOLIC BLOOD PRESSURE: 71 MMHG | HEART RATE: 72 BPM

## 2020-11-26 LAB — TROPONIN I SERPL-MCNC: <0.02 NG/ML

## 2020-11-26 PROCEDURE — 84484 ASSAY OF TROPONIN QUANT: CPT | Performed by: EMERGENCY MEDICINE

## 2020-11-26 PROCEDURE — 36415 COLL VENOUS BLD VENIPUNCTURE: CPT | Performed by: EMERGENCY MEDICINE

## 2020-11-27 LAB
ATRIAL RATE: 82 BPM
P AXIS: 61 DEGREES
PR INTERVAL: 158 MS
QRS AXIS: 30 DEGREES
QRSD INTERVAL: 84 MS
QT INTERVAL: 410 MS
QTC INTERVAL: 479 MS
T WAVE AXIS: 149 DEGREES
VENTRICULAR RATE: 82 BPM

## 2020-11-27 PROCEDURE — 93010 ELECTROCARDIOGRAM REPORT: CPT | Performed by: INTERNAL MEDICINE

## 2021-10-07 ENCOUNTER — OFFICE VISIT (OUTPATIENT)
Dept: SURGICAL ONCOLOGY | Facility: CLINIC | Age: 61
End: 2021-10-07
Payer: COMMERCIAL

## 2021-10-07 ENCOUNTER — TELEPHONE (OUTPATIENT)
Dept: SURGICAL ONCOLOGY | Facility: CLINIC | Age: 61
End: 2021-10-07

## 2021-10-07 VITALS
BODY MASS INDEX: 34.5 KG/M2 | RESPIRATION RATE: 16 BRPM | OXYGEN SATURATION: 96 % | HEART RATE: 65 BPM | WEIGHT: 201 LBS | TEMPERATURE: 98.7 F | DIASTOLIC BLOOD PRESSURE: 86 MMHG | SYSTOLIC BLOOD PRESSURE: 130 MMHG

## 2021-10-07 DIAGNOSIS — C44.99 DERMATOFIBROSARCOMA: Primary | ICD-10-CM

## 2021-10-07 PROCEDURE — 99213 OFFICE O/P EST LOW 20 MIN: CPT | Performed by: SURGERY

## 2023-04-19 ENCOUNTER — APPOINTMENT (EMERGENCY)
Dept: CT IMAGING | Facility: HOSPITAL | Age: 63
End: 2023-04-19

## 2023-04-19 ENCOUNTER — APPOINTMENT (EMERGENCY)
Dept: RADIOLOGY | Facility: HOSPITAL | Age: 63
End: 2023-04-19

## 2023-04-19 ENCOUNTER — HOSPITAL ENCOUNTER (EMERGENCY)
Facility: HOSPITAL | Age: 63
Discharge: HOME | End: 2023-04-19
Attending: EMERGENCY MEDICINE

## 2023-04-19 VITALS
BODY MASS INDEX: 33.99 KG/M2 | DIASTOLIC BLOOD PRESSURE: 66 MMHG | WEIGHT: 198 LBS | RESPIRATION RATE: 17 BRPM | SYSTOLIC BLOOD PRESSURE: 126 MMHG | OXYGEN SATURATION: 98 % | TEMPERATURE: 98.8 F | HEART RATE: 65 BPM

## 2023-04-19 DIAGNOSIS — R53.83 FATIGUE: Primary | ICD-10-CM

## 2023-04-19 LAB
ALBUMIN SERPL BCP-MCNC: 4.2 G/DL (ref 3.5–5)
ALP SERPL-CCNC: 87 U/L (ref 34–104)
ALT SERPL W P-5'-P-CCNC: 35 U/L (ref 7–52)
ANION GAP SERPL CALCULATED.3IONS-SCNC: 9 MMOL/L (ref 4–13)
AST SERPL W P-5'-P-CCNC: 34 U/L (ref 13–39)
ATRIAL RATE: 67 BPM
BACTERIA UR QL AUTO: ABNORMAL /HPF
BASOPHILS # BLD AUTO: 0.04 THOUSANDS/ΜL (ref 0–0.1)
BASOPHILS NFR BLD AUTO: 1 % (ref 0–1)
BILIRUB SERPL-MCNC: 0.81 MG/DL (ref 0.2–1)
BILIRUB UR QL STRIP: NEGATIVE
BUN SERPL-MCNC: 11 MG/DL (ref 5–25)
CALCIUM SERPL-MCNC: 9.9 MG/DL (ref 8.4–10.2)
CARDIAC TROPONIN I PNL SERPL HS: 5 NG/L
CHLORIDE SERPL-SCNC: 104 MMOL/L (ref 96–108)
CLARITY UR: CLEAR
CO2 SERPL-SCNC: 23 MMOL/L (ref 21–32)
COLOR UR: YELLOW
CREAT SERPL-MCNC: 0.68 MG/DL (ref 0.6–1.3)
EOSINOPHIL # BLD AUTO: 0.25 THOUSAND/ΜL (ref 0–0.61)
EOSINOPHIL NFR BLD AUTO: 3 % (ref 0–6)
ERYTHROCYTE [DISTWIDTH] IN BLOOD BY AUTOMATED COUNT: 12.6 % (ref 11.6–15.1)
FLUAV RNA RESP QL NAA+PROBE: NEGATIVE
FLUBV RNA RESP QL NAA+PROBE: NEGATIVE
GFR SERPL CREATININE-BSD FRML MDRD: 95 ML/MIN/1.73SQ M
GLUCOSE SERPL-MCNC: 192 MG/DL (ref 65–140)
GLUCOSE UR STRIP-MCNC: ABNORMAL MG/DL
HCT VFR BLD AUTO: 43 % (ref 34.8–46.1)
HGB BLD-MCNC: 14.9 G/DL (ref 11.5–15.4)
HGB UR QL STRIP.AUTO: NEGATIVE
IMM GRANULOCYTES # BLD AUTO: 0.02 THOUSAND/UL (ref 0–0.2)
IMM GRANULOCYTES NFR BLD AUTO: 0 % (ref 0–2)
KETONES UR STRIP-MCNC: NEGATIVE MG/DL
LEUKOCYTE ESTERASE UR QL STRIP: NEGATIVE
LYMPHOCYTES # BLD AUTO: 2.91 THOUSANDS/ΜL (ref 0.6–4.47)
LYMPHOCYTES NFR BLD AUTO: 33 % (ref 14–44)
MCH RBC QN AUTO: 29.3 PG (ref 26.8–34.3)
MCHC RBC AUTO-ENTMCNC: 34.7 G/DL (ref 31.4–37.4)
MCV RBC AUTO: 85 FL (ref 82–98)
MONOCYTES # BLD AUTO: 0.77 THOUSAND/ΜL (ref 0.17–1.22)
MONOCYTES NFR BLD AUTO: 9 % (ref 4–12)
MUCOUS THREADS UR QL AUTO: ABNORMAL
NEUTROPHILS # BLD AUTO: 4.85 THOUSANDS/ΜL (ref 1.85–7.62)
NEUTS SEG NFR BLD AUTO: 54 % (ref 43–75)
NITRITE UR QL STRIP: NEGATIVE
NON-SQ EPI CELLS URNS QL MICRO: ABNORMAL /HPF
NRBC BLD AUTO-RTO: 0 /100 WBCS
P AXIS: 58 DEGREES
PH UR STRIP.AUTO: 6 [PH]
PLATELET # BLD AUTO: 188 THOUSANDS/UL (ref 149–390)
PMV BLD AUTO: 12.3 FL (ref 8.9–12.7)
POTASSIUM SERPL-SCNC: 4.1 MMOL/L (ref 3.5–5.3)
PR INTERVAL: 160 MS
PROT SERPL-MCNC: 7.5 G/DL (ref 6.4–8.4)
PROT UR STRIP-MCNC: ABNORMAL MG/DL
QRS AXIS: 36 DEGREES
QRSD INTERVAL: 82 MS
QT INTERVAL: 440 MS
QTC INTERVAL: 464 MS
RBC # BLD AUTO: 5.09 MILLION/UL (ref 3.81–5.12)
RBC #/AREA URNS AUTO: ABNORMAL /HPF
RSV RNA RESP QL NAA+PROBE: NEGATIVE
SARS-COV-2 RNA RESP QL NAA+PROBE: NEGATIVE
SODIUM SERPL-SCNC: 136 MMOL/L (ref 135–147)
SP GR UR STRIP.AUTO: 1.02 (ref 1–1.03)
T WAVE AXIS: 183 DEGREES
TSH SERPL DL<=0.05 MIU/L-ACNC: 2.84 UIU/ML (ref 0.45–4.5)
UROBILINOGEN UR STRIP-ACNC: 2 MG/DL
VENTRICULAR RATE: 67 BPM
WBC # BLD AUTO: 8.84 THOUSAND/UL (ref 4.31–10.16)
WBC #/AREA URNS AUTO: ABNORMAL /HPF

## 2023-12-15 ENCOUNTER — TELEPHONE (OUTPATIENT)
Dept: HEMATOLOGY ONCOLOGY | Facility: CLINIC | Age: 63
End: 2023-12-15

## 2023-12-15 NOTE — TELEPHONE ENCOUNTER
Appointment Schedule   Who are you speaking with? Patient   If it is not the patient, are they listed on an active communication consent form? N/A   Which provider is the appointment scheduled with? Dr. Roger Pierre   At which location is the appointment scheduled for? [facility]   When is the appointment scheduled? Please list date and time 3/27/24 1:00pm   What is the reason for this appointment? Follow up, [facility] Dr. Mary Naranjo. Patient saw Dr. Roger Pierre in the past   Did patient voice understanding of the details of this appointment? Yes   Was the no show policy reviewed with patient?  Yes

## 2024-03-27 ENCOUNTER — OFFICE VISIT (OUTPATIENT)
Dept: SURGICAL ONCOLOGY | Facility: CLINIC | Age: 64
End: 2024-03-27
Payer: COMMERCIAL

## 2024-03-27 VITALS
SYSTOLIC BLOOD PRESSURE: 116 MMHG | OXYGEN SATURATION: 97 % | WEIGHT: 190 LBS | HEIGHT: 64 IN | HEART RATE: 72 BPM | DIASTOLIC BLOOD PRESSURE: 78 MMHG | BODY MASS INDEX: 32.44 KG/M2

## 2024-03-27 DIAGNOSIS — C44.99 DERMATOFIBROSARCOMA PROTUBERANS: Primary | ICD-10-CM

## 2024-03-27 PROCEDURE — 99205 OFFICE O/P NEW HI 60 MIN: CPT | Performed by: SURGERY

## 2024-03-27 NOTE — PROGRESS NOTES
Surgical Oncology Follow Up  [facility]  [facility] [place]  [address] [address]    Lidia Patricio  1960  [id number]      Chief Complaint   Patient presents with    Follow-up     Dermatofibromasarcoma        Assessment & Plan:   This is 63-year-old female who is very well-known to me from my previous practice with large soft tissue mass in her posterior neck initially was resected by general surgery team.  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  In 2017 this was reexcised by myself for negative margin but close inferior lateral margin.  At this point patient was followed clinically.  She is overall doing well no evidence of local regional recurrence.  She would like to establish follow-up with me.  She also have a autoimmune disorder with tremors and in the process of workup for carotid stenosis.  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   HENT:  Negative for ear pain and sore throat.    Eyes:  Negative for pain and visual disturbance.   Respiratory:  Negative for cough and shortness of breath.    Cardiovascular:  Negative for chest pain and palpitations.   Gastrointestinal:  Negative for abdominal pain and vomiting.   Genitourinary:  Negative for dysuria and hematuria.   Musculoskeletal:  Negative for arthralgias and back pain.   Skin:  Negative for color change and rash.   Neurological:  Negative for seizures and syncope.   Hematological:  Negative for adenopathy.    All other systems reviewed and are negative.      Past Medical History     Patient Active Problem List   Diagnosis    Dermatofibrosarcoma    Type 2 diabetes mellitus without complication (HCC)    Lyme arthritis (HCC)    IBS (irritable bowel syndrome)    Hyperlipidemia    Essential tremor    Essential (primary) hypertension    Allergic asthma    Numbness    Carotid artery aneurysm (HCC)    Anxiety    Neuropathy     Past Medical History:   Diagnosis Date    Asthma     Cancer (HCC)     soft tissue    Colon polyp     Depression     Diabetes insipidus (HCC)     Diabetes mellitus (HCC)     Hypertension     Stomach disorder     Tremors of nervous system      Past Surgical History:   Procedure Laterality Date    BREAST LUMPECTOMY      CHOLECYSTECTOMY      COLONOSCOPY      KIDNEY STONE SURGERY      SKIN GRAFT      TOTAL ABDOMINAL HYSTERECTOMY       Family History   Problem Relation Age of Onset    Heart attack Mother     Lung cancer Mother 60    Kidney cancer Father 45    Stomach cancer Maternal Grandmother         70-80     Social History     Socioeconomic History    Marital status: /Civil Union     Spouse name: Not on file    Number of children: Not on file    Years of education: Not on file    Highest education level: Not on file   Occupational History    Not on file   Tobacco Use    Smoking status: Every Day     Current packs/day: 0.20     Types: Cigarettes    Smokeless tobacco: Never   Substance and Sexual Activity    Alcohol use: Not Currently    Drug use: No    Sexual activity: Not on file   Other Topics Concern    Not on file   Social History Narrative    Not on file     Social Determinants of Health     Financial Resource Strain: Not on file   Food Insecurity: Not on file   Transportation Needs: Not on file   Physical Activity: Not on file   Stress: Not on file   Social Connections: Not on file   Intimate Partner Violence: Unknown (7/15/2022)    Received from [facility]    Intimate Partner  Violence     Within the last year, have you been afraid of your partner, ex-partner or family member?: Not on file     Within the last year, have you been humiliated or emotionally abused in other ways by your partner, ex-partner or family member?: Not on file     Within the last year, have you been kicked, hit, slapped, or otherwise physically hurt by your partner, ex-partner or family member?: Not on file     Within the last year, have you been raped or forced to have any kind of sexual activity by your partner, ex-partner or family member?: Not on file   Housing Stability: Not on file       Current Outpatient Medications:     AMLODIPINE BESYLATE PO, Take 10 mg by mouth daily , Disp: , Rfl:     aspirin 81 mg chewable tablet, Chew 1 tablet (81 mg total) daily, Disp: 30 tablet, Rfl: 0    atorvastatin (LIPITOR) 40 mg tablet, Take 1 tablet (40 mg total) by mouth daily with dinner, Disp: 30 tablet, Rfl: 0    escitalopram (LEXAPRO) 10 mg tablet, Take 10 mg by mouth daily, Disp: , Rfl:     loperamide (IMODIUM) 2 mg capsule, Take 2 mg by mouth as needed for diarrhea, Disp: , Rfl:     loratadine (CLARITIN) 10 mg tablet, Take 1 tablet by mouth daily, Disp: 20 tablet, Rfl: 0    LORazepam (ATIVAN) 0.5 mg tablet, Take 0.5 mg by mouth, Disp: , Rfl:     metFORMIN (GLUCOPHAGE) 500 mg tablet, Take 500 mg by mouth 2 (two) times a day with meals, Disp: , Rfl:     omega-3-acid ethyl esters (LOVAZA) 1 g capsule, Take 2 g by mouth 2 (two) times a day, Disp: , Rfl: 3    pantoprazole (PROTONIX) 40 mg tablet, Take 1 tablet (40 mg total) by mouth daily, Disp: 31 tablet, Rfl: 6    PROAIR  (90 Base) MCG/ACT inhaler, Inhale 2 puffs every 4 (four) hours as needed for wheezing, Disp: , Rfl: 3    propranolol (INDERAL) 10 mg tablet, Take 10 mg by mouth 2 (two) times a day, Disp: , Rfl:     gabapentin (NEURONTIN) 100 mg capsule, TAKE 1 CAPSULE BY MOUTH THREE TIMES A DAY (Patient not taking: No sig reported), Disp: 90 capsule, Rfl: 5     tamsulosin (FLOMAX) 0.4 mg, Take 1 capsule (0.4 mg total) by mouth daily with dinner for 7 days (Patient not taking: Reported on 3/27/2024), Disp: 7 capsule, Rfl: 0  Allergies   Allergen Reactions    Gabapentin Other (See Comments)     Depression    Latex Itching       Physical Exam:     Vitals:    03/27/24 1254   BP: 116/78   Pulse: 72   SpO2: 97%     Physical Exam  Constitutional:       Appearance: Normal appearance.   HENT:      Head: Normocephalic and atraumatic.      Nose: Nose normal.      Mouth/Throat:      Mouth: Mucous membranes are moist.   Eyes:      Pupils: Pupils are equal, round, and reactive to light.   Cardiovascular:      Rate and Rhythm: Normal rate.      Pulses: Normal pulses.      Heart sounds: Normal heart sounds.   Pulmonary:      Effort: Pulmonary effort is normal.      Breath sounds: Normal breath sounds.   Abdominal:      General: Bowel sounds are normal.      Palpations: Abdomen is soft.   Musculoskeletal:         General: Normal range of motion.      Cervical back: Normal range of motion and neck supple.   Skin:     General: Skin is warm.             Comments: Well-healed posterior neck surgical scar no evidence of local regional recurrence.  No regional adenopathy   Neurological:      General: No focal deficit present.      Mental Status: She is alert and oriented to person, place, and time.   Psychiatric:         Mood and Affect: Mood normal.         Behavior: Behavior normal.         Thought Content: Thought content normal.         Judgment: Judgment normal.           Results & Discussion:   I did review available outside CT chest abdomen pelvis from last year no evidence of local disease.  She is overall doing well.  I did explain to her dermatofibrosarcoma protuberans pathophysiology.  Since her diagnosis of DFSP more than 5 years she is overall doing well.  We will obtain staging CT scan and follow her.  she understands and  agrees . All patient questions were answered.       Advance  Care Planning/Advance Directives:  I Owen Petersen MD discussed the disease status with Lidia Patricio  today 03/27/24  treatment plans and follow-up with the patient.

## 2024-04-16 ENCOUNTER — HOSPITAL ENCOUNTER (OUTPATIENT)
Dept: CT IMAGING | Facility: HOSPITAL | Age: 64
Discharge: HOME | End: 2024-04-16
Attending: SURGERY
Payer: COMMERCIAL

## 2024-04-16 DIAGNOSIS — C44.99 DERMATOFIBROSARCOMA PROTUBERANS: ICD-10-CM

## 2024-04-16 PROCEDURE — 74177 CT ABD & PELVIS W/CONTRAST: CPT

## 2024-04-16 PROCEDURE — 71260 CT THORAX DX C+: CPT

## 2024-04-16 RX ADMIN — IOHEXOL 100 ML: 350 INJECTION, SOLUTION INTRAVENOUS at 16:14

## 2024-04-24 ENCOUNTER — OFFICE VISIT (OUTPATIENT)
Dept: SURGICAL ONCOLOGY | Facility: CLINIC | Age: 64
End: 2024-04-24
Payer: COMMERCIAL

## 2024-04-24 ENCOUNTER — TELEPHONE (OUTPATIENT)
Dept: HEMATOLOGY ONCOLOGY | Facility: CLINIC | Age: 64
End: 2024-04-24

## 2024-04-24 VITALS
WEIGHT: 191 LBS | HEIGHT: 64 IN | HEART RATE: 81 BPM | BODY MASS INDEX: 32.61 KG/M2 | DIASTOLIC BLOOD PRESSURE: 66 MMHG | SYSTOLIC BLOOD PRESSURE: 110 MMHG | OXYGEN SATURATION: 95 %

## 2024-04-24 DIAGNOSIS — E04.1 THYROID NODULE: ICD-10-CM

## 2024-04-24 DIAGNOSIS — C44.99 DERMATOFIBROSARCOMA PROTUBERANS: Primary | ICD-10-CM

## 2024-04-24 PROCEDURE — 99215 OFFICE O/P EST HI 40 MIN: CPT | Performed by: SURGERY

## 2024-04-24 RX ORDER — AMLODIPINE BESYLATE 10 MG/1
10 TABLET ORAL DAILY
COMMUNITY
Start: 2024-02-28

## 2024-04-24 RX ORDER — PROPRANOLOL HCL 60 MG
60 CAPSULE, EXTENDED RELEASE 24HR ORAL DAILY
COMMUNITY
Start: 2024-02-02

## 2024-04-24 RX ORDER — LEFLUNOMIDE 10 MG/1
10 TABLET ORAL DAILY
COMMUNITY
Start: 2023-12-08

## 2024-04-24 RX ORDER — SIMVASTATIN 10 MG
1 TABLET ORAL EVERY EVENING
COMMUNITY
Start: 2023-11-01

## 2024-04-24 NOTE — TELEPHONE ENCOUNTER
Call Transfer   Who are you speaking with?  [facility]   If it is not the patient, are they listed on an active communication consent form? Yes   Who is the patients HemOnc/SurgOnc provider? Dr. Petersen   What is the reason for this call? EKG orders from Dr Petersen   Person/Department that the call was transferred to?    Time that call was transferred?   DELANEY Mills 1:30pm 4/24   Your call will be transferred now. If you receive a voicemail, please leave a detailed message and a member of the team will return your call as soon as possible.    Did you relay this information to the caller?  Yes

## 2024-04-24 NOTE — TELEPHONE ENCOUNTER
Called patient. She was confused about the thyroid ultrasound. Advised patient of appointment on 5/3/24. Patient appreciative. All questions answered at this time.

## 2024-04-24 NOTE — PROGRESS NOTES
Surgical Oncology Follow Up  [facility]  [facility] [place]  [address] [address]    Lidia Patricio  1960  [id number]      Chief Complaint   Patient presents with   • Follow-up     4 Week Follow Up        Assessment & Plan:   63-year-old female follow-up with staging CT chest for history dermatofibrosarcoma of the  neck.  CT scan was reviewed and discussed no evidence of distant disease.  We will obtain a thyroid ultrasound to delineate any thyroid nodules and follow her.    Cancer History:     Oncology History   Dermatofibrosarcoma protuberans   2/24/2017 Initial Diagnosis    Dermatofibrosarcoma of left neck     5/3/2017 Surgery    Wide excision  Dermatofibrosarcoma protuberans, tumor present 0.1cm from inferior-lateral margin (closest margin)  9.5 x 6.5 x 4 cm  Dermatofibrosarcoma branch measured 1.4 cm           Interval History:   Follow-up with dermatofibrosarcoma protuberans    Review of Systems:   Review of Systems   Constitutional:  Negative for chills and fever.   HENT:  Negative for ear pain and sore throat.    Eyes:  Negative for pain and visual disturbance.   Respiratory:  Negative for cough and shortness of breath.    Cardiovascular:  Negative for chest pain and palpitations.   Gastrointestinal:  Negative for abdominal pain and vomiting.   Genitourinary:  Negative for dysuria and hematuria.   Musculoskeletal:  Negative for arthralgias and back pain.   Skin:  Negative for color change and rash.   Neurological:  Positive for tremors. Negative for seizures and syncope.   Hematological:  Negative for adenopathy.   All other systems reviewed and are negative.    Past Medical History     Patient Active Problem List   Diagnosis   • Dermatofibrosarcoma protuberans   • Type 2 diabetes mellitus without complication (HCC)   • Lyme arthritis (HCC)   • IBS (irritable bowel syndrome)   • Hyperlipidemia   • Essential tremor   • Essential  (primary) hypertension   • Allergic asthma   • Numbness   • Carotid artery aneurysm (HCC)   • Anxiety   • Neuropathy     Past Medical History:   Diagnosis Date   • Asthma    • Cancer (HCC)     soft tissue   • Colon polyp    • Depression    • Diabetes insipidus (HCC)    • Diabetes mellitus (HCC)    • Hypertension    • Stomach disorder    • Tremors of nervous system      Past Surgical History:   Procedure Laterality Date   • BREAST LUMPECTOMY     • CHOLECYSTECTOMY     • COLONOSCOPY     • KIDNEY STONE SURGERY     • SKIN GRAFT     • TOTAL ABDOMINAL HYSTERECTOMY       Family History   Problem Relation Age of Onset   • Heart attack Mother    • Lung cancer Mother 60   • Kidney cancer Father 45   • Stomach cancer Maternal Grandmother         70-80     Social History     Socioeconomic History   • Marital status: /Civil Union     Spouse name: Not on file   • Number of children: Not on file   • Years of education: Not on file   • Highest education level: Not on file   Occupational History   • Not on file   Tobacco Use   • Smoking status: Every Day     Current packs/day: 0.20     Types: Cigarettes   • Smokeless tobacco: Never   Substance and Sexual Activity   • Alcohol use: Not Currently   • Drug use: No   • Sexual activity: Not on file   Other Topics Concern   • Not on file   Social History Narrative   • Not on file     Social Determinants of Health     Financial Resource Strain: Not on file   Food Insecurity: Not on file   Transportation Needs: Not on file   Physical Activity: Not on file   Stress: Not on file   Social Connections: Not on file   Intimate Partner Violence: Unknown (7/15/2022)    Received from [facility]    Intimate Partner Violence    • Within the last year, have you been afraid of your partner, ex-partner or family member?: Not on file    • Within the last year, have you been humiliated or emotionally abused in other ways by your partner, ex-partner or family member?: Not on file    •  Within the last year, have you been kicked, hit, slapped, or otherwise physically hurt by your partner, ex-partner or family member?: Not on file    • Within the last year, have you been raped or forced to have any kind of sexual activity by your partner, ex-partner or family member?: Not on file   Housing Stability: Not on file       Current Outpatient Medications:   •  amLODIPine (NORVASC) 10 mg tablet, Take 10 mg by mouth daily, Disp: , Rfl:   •  AMLODIPINE BESYLATE PO, Take 10 mg by mouth daily , Disp: , Rfl:   •  aspirin 81 mg chewable tablet, Chew 1 tablet (81 mg total) daily, Disp: 30 tablet, Rfl: 0  •  atorvastatin (LIPITOR) 40 mg tablet, Take 1 tablet (40 mg total) by mouth daily with dinner, Disp: 30 tablet, Rfl: 0  •  escitalopram (LEXAPRO) 10 mg tablet, Take 10 mg by mouth daily, Disp: , Rfl:   •  leflunomide (ARAVA) 10 MG tablet, Take 10 mg by mouth daily, Disp: , Rfl:   •  loperamide (IMODIUM) 2 mg capsule, Take 2 mg by mouth as needed for diarrhea, Disp: , Rfl:   •  loratadine (CLARITIN) 10 mg tablet, Take 1 tablet by mouth daily, Disp: 20 tablet, Rfl: 0  •  LORazepam (ATIVAN) 0.5 mg tablet, Take 0.5 mg by mouth, Disp: , Rfl:   •  metFORMIN (GLUCOPHAGE) 500 mg tablet, Take 500 mg by mouth 2 (two) times a day with meals, Disp: , Rfl:   •  omega-3-acid ethyl esters (LOVAZA) 1 g capsule, Take 2 g by mouth 2 (two) times a day, Disp: , Rfl: 3  •  pantoprazole (PROTONIX) 40 mg tablet, Take 1 tablet (40 mg total) by mouth daily, Disp: 31 tablet, Rfl: 6  •  PROAIR  (90 Base) MCG/ACT inhaler, Inhale 2 puffs every 4 (four) hours as needed for wheezing, Disp: , Rfl: 3  •  propranolol (INDERAL LA) 60 mg 24 hr capsule, Take 60 mg by mouth daily, Disp: , Rfl:   •  simvastatin (ZOCOR) 10 mg tablet, Take 1 tablet by mouth every evening, Disp: , Rfl:   •  gabapentin (NEURONTIN) 100 mg capsule, TAKE 1 CAPSULE BY MOUTH THREE TIMES A DAY (Patient not taking: No sig reported), Disp: 90 capsule, Rfl: 5  •   propranolol (INDERAL) 10 mg tablet, Take 10 mg by mouth 2 (two) times a day, Disp: , Rfl:   •  tamsulosin (FLOMAX) 0.4 mg, Take 1 capsule (0.4 mg total) by mouth daily with dinner for 7 days (Patient not taking: Reported on 3/27/2024), Disp: 7 capsule, Rfl: 0  Allergies   Allergen Reactions   • Gabapentin Other (See Comments)     Depression   • Latex Itching       Physical Exam:     Vitals:    04/24/24 1244   BP: 110/66   Pulse: 81   SpO2: 95%     Physical Exam  Constitutional:       Appearance: Normal appearance. She is normal weight.   HENT:      Head: Normocephalic and atraumatic.      Nose: Nose normal.      Mouth/Throat:      Mouth: Mucous membranes are moist.   Eyes:      Pupils: Pupils are equal, round, and reactive to light.   Cardiovascular:      Rate and Rhythm: Normal rate.      Pulses: Normal pulses.      Heart sounds: Normal heart sounds.   Pulmonary:      Effort: Pulmonary effort is normal.      Breath sounds: Normal breath sounds.   Abdominal:      General: Bowel sounds are normal.      Palpations: Abdomen is soft.   Musculoskeletal:         General: Normal range of motion.      Cervical back: Normal range of motion and neck supple.   Skin:     General: Skin is warm.      Coloration: Skin is not jaundiced.             Comments: Well-healed posterior neck surgical scar no evidence of local recurrence no regional adenopathy   Neurological:      General: No focal deficit present.      Mental Status: She is alert and oriented to person, place, and time. Mental status is at baseline.   Psychiatric:         Mood and Affect: Mood normal.         Behavior: Behavior normal.         Thought Content: Thought content normal.         Judgment: Judgment normal.       Results & Discussion:   CT CHEST, ABDOMEN AND PELVIS WITH IV CONTRAST     INDICATION: C44.99: Other specified malignant neoplasm of skin, unspecified.     COMPARISON: CTA chest abdomen and pelvis dated 11/25/2020 and CT abdomen and pelvis dated  11/15/2019     TECHNIQUE: CT examination of the chest, abdomen and pelvis was performed. Multiplanar 2D reformatted images were created from the source data.     This examination, like all CT scans performed in the [facility], was performed utilizing techniques to minimize radiation dose exposure, including the use of iterative reconstruction and automated exposure control. Radiation dose length   product (DLP) for this visit: 692 mGy-cm     IV Contrast: 100 mL of iohexol (OMNIPAQUE)  Enteric Contrast: Not administered.     FINDINGS:     CHEST     LUNGS: Lungs are clear. No tracheal or endobronchial lesion.     PLEURA: Unremarkable.     HEART/GREAT VESSELS: Calcification of the mitral annulus.. No thoracic aortic aneurysm.     MEDIASTINUM AND ALTAF: Prominent precarinal lymph node measuring 9 mm in short axis. Other mediastinal lymph nodes are present of lesser size.     CHEST WALL AND LOWER NECK: Incidental discovery of one or more thyroid nodule(s) measuring less than 1.5 cm and without suspicious features is noted in this patient who is above 35 years old; according to guidelines published in the February 2015 white   paper on incidental thyroid nodules in the Journal of the American College of Radiology (JACR), no further evaluation is recommended.     ABDOMEN     LIVER/BILIARY TREE: Heterogeneity of the liver may be due to hepatic steatosis. No worrisome intrahepatic mass is evident. Common bile duct is dilated measuring 12 mm.     GALLBLADDER: Post cholecystectomy.     SPLEEN: Unremarkable.     PANCREAS: Unremarkable.     ADRENAL GLANDS: Unremarkable.     KIDNEYS/URETERS: Partially exophytic low-attenuation lesion at the inferior posterior pole of the left kidney measuring 0.8 cm, too small to characterize by CT but unchanged when compared to the study from 11/5/2020. No hydronephrosis or hydroureter.     STOMACH AND BOWEL: Colonic diverticulosis without findings of acute diverticulitis.      APPENDIX: No findings to suggest appendicitis.     ABDOMINOPELVIC CAVITY: No ascites. No pneumoperitoneum. No lymphadenopathy.     VESSELS: Atherosclerosis without abdominal aortic aneurysm.     PELVIS     REPRODUCTIVE ORGANS: Post hysterectomy.     URINARY BLADDER: Under distended diminishing diagnostic assessment. No calculi.     ABDOMINAL WALL/INGUINAL REGIONS: Unremarkable.     BONES: No acute fracture or suspicious osseous lesion. Spinal degenerative changes.     IMPRESSION:     1. No suspicious pulmonary nodules.  2. Prominent precarinal lymph node measuring 9 mm though present on the prior exam without significant change, otherwise no evidence of pathologically enlarged adenopathy in the chest abdomen and pelvis.  3. Possible hepatic steatosis of the hepatic parenchyma without worrisome intrahepatic mass.  4. Additional findings described above.     I did review CT chest abdomen for pelvis films and agree with the report.  With regard to precarinal lymph node less than a centimeter and without any significant changes we will follow.  We will obtain a thyroid ultrasound to delineate any thyroid nodules.  I did discussed in detail nature of breast DFSP risk of recurrence.  Given her original diagnosis greater than 8 years chances of recurrence less. however we need to follow her closely she understands and  agrees . All patient questions were answered.       Advance Care Planning/Advance Directives:  I Owen Petersen MD discussed the disease status with Lidia Patricio  today 04/24/24  treatment plans and follow-up with the patient.

## 2024-05-03 ENCOUNTER — HOSPITAL ENCOUNTER (OUTPATIENT)
Dept: ULTRASOUND IMAGING | Facility: HOSPITAL | Age: 64
Discharge: HOME | End: 2024-05-03
Attending: SURGERY
Payer: COMMERCIAL

## 2024-05-03 DIAGNOSIS — E04.1 THYROID NODULE: ICD-10-CM

## 2024-05-03 PROCEDURE — 76536 US EXAM OF HEAD AND NECK: CPT

## 2024-05-15 ENCOUNTER — TELEPHONE (OUTPATIENT)
Dept: HEMATOLOGY ONCOLOGY | Facility: CLINIC | Age: 64
End: 2024-05-15

## 2024-05-15 NOTE — TELEPHONE ENCOUNTER
Call Transfer   Who are you speaking with?  Patient   If it is not the patient, are they listed on an active communication consent form? N/A   Who is the patients HemOnc/SurgOnc provider? Dr. Petersen   What is the reason for this call? US results   Person/Department that the call was transferred to?    Time that call was transferred?    Xi P   Your call will be transferred now. If you receive a voicemail, please leave a detailed message and a member of the team will return your call as soon as possible.    Did you relay this information to the caller?  Yes

## 2024-05-15 NOTE — TELEPHONE ENCOUNTER
Spoke with pt regarding her thyroid US. Pt verbalized understanding and was appreciative of the call. Pt has f/u appt in July. Dr Petersen will order f/u imaging at that time if needed.

## 2024-07-25 PROBLEM — E04.1 THYROID NODULE: Status: ACTIVE | Noted: 2024-07-25

## 2024-08-05 ENCOUNTER — OFFICE VISIT (OUTPATIENT)
Dept: SURGICAL ONCOLOGY | Facility: CLINIC | Age: 64
End: 2024-08-05
Payer: COMMERCIAL

## 2024-08-05 VITALS
BODY MASS INDEX: 33.12 KG/M2 | HEIGHT: 64 IN | HEART RATE: 75 BPM | WEIGHT: 194 LBS | SYSTOLIC BLOOD PRESSURE: 104 MMHG | OXYGEN SATURATION: 96 % | DIASTOLIC BLOOD PRESSURE: 80 MMHG

## 2024-08-05 DIAGNOSIS — E04.1 THYROID NODULE: Primary | ICD-10-CM

## 2024-08-05 DIAGNOSIS — C44.99 DERMATOFIBROSARCOMA PROTUBERANS: ICD-10-CM

## 2024-08-05 PROCEDURE — 99215 OFFICE O/P EST HI 40 MIN: CPT | Performed by: SURGERY

## 2024-08-05 NOTE — PROGRESS NOTES
Surgical Oncology Follow Up  [facility]  [facility] [place]  [address] [id number]    Lidia Patricio  1960  [id number]      Chief Complaint   Patient presents with   • Follow-up     3 mo f/u 5/3/24 thyroid us right upper pole nodule        Assessment & Plan:   63-year-old female with history of DFSP resection posterior neck with nontoxic single thyroid nodule.  She is here for follow-up after ultrasound of the neck.  She is overall doing well she has no resting tremor otherwise clinically euthyroid.    Cancer History:     Oncology History   Dermatofibrosarcoma protuberans   2/24/2017 Initial Diagnosis    Dermatofibrosarcoma of left neck     5/3/2017 Surgery    Wide excision  Dermatofibrosarcoma protuberans, tumor present 0.1cm from inferior-lateral margin (closest margin)  9.5 x 6.5 x 4 cm  Dermatofibrosarcoma branch measured 1.4 cm           Interval History:   Follow-up with nontoxic thyroid nodule.    Review of Systems:   Review of Systems   Constitutional:  Negative for chills and fever.   HENT:  Negative for ear pain and sore throat.    Eyes:  Negative for pain and visual disturbance.   Respiratory:  Negative for cough and shortness of breath.    Cardiovascular:  Negative for chest pain and palpitations.   Gastrointestinal:  Negative for abdominal pain and vomiting.   Genitourinary:  Negative for dysuria and hematuria.   Musculoskeletal:  Negative for arthralgias and back pain.   Skin:  Negative for color change and rash.   Neurological:  Positive for tremors. Negative for seizures and syncope.   All other systems reviewed and are negative.    Past Medical History     Patient Active Problem List   Diagnosis   • Dermatofibrosarcoma protuberans   • Type 2 diabetes mellitus without complication (HCC)   • Lyme arthritis (HCC)   • IBS (irritable bowel syndrome)   • Hyperlipidemia   • Essential tremor   • Essential (primary) hypertension   •  Allergic asthma   • Numbness   • Carotid artery aneurysm (HCC)   • Anxiety   • Neuropathy   • Thyroid nodule     Past Medical History:   Diagnosis Date   • Asthma    • Cancer (HCC)     soft tissue   • Colon polyp    • Depression    • Diabetes insipidus (HCC)    • Diabetes mellitus (HCC)    • Hypertension    • Stomach disorder    • Tremors of nervous system      Past Surgical History:   Procedure Laterality Date   • BREAST LUMPECTOMY     • CHOLECYSTECTOMY     • COLONOSCOPY     • KIDNEY STONE SURGERY     • SKIN GRAFT     • TOTAL ABDOMINAL HYSTERECTOMY       Family History   Problem Relation Age of Onset   • Heart attack Mother    • Lung cancer Mother 60   • Kidney cancer Father 45   • Stomach cancer Maternal Grandmother         70-80     Social History     Socioeconomic History   • Marital status: /Civil Union     Spouse name: Not on file   • Number of children: Not on file   • Years of education: Not on file   • Highest education level: Not on file   Occupational History   • Not on file   Tobacco Use   • Smoking status: Every Day     Current packs/day: 0.20     Types: Cigarettes   • Smokeless tobacco: Never   Substance and Sexual Activity   • Alcohol use: Not Currently   • Drug use: No   • Sexual activity: Not on file   Other Topics Concern   • Not on file   Social History Narrative   • Not on file     Social Determinants of Health     Financial Resource Strain: Not on file   Food Insecurity: Not on file   Transportation Needs: Not on file   Physical Activity: Not on file   Stress: Not on file   Social Connections: Not on file   Intimate Partner Violence: Unknown (7/15/2022)    [facility]    Intimate Partner Violence    • Within the last year, have you been afraid of your partner, ex-partner or family member?: Not on file    • Within the last year, have you been humiliated or emotionally abused in other ways by your partner, ex-partner or family member?:  Not on file    • Within the last year, have you been kicked, hit, slapped, or otherwise physically hurt by your partner, ex-partner or family member?: Not on file    • Within the last year, have you been raped or forced to have any kind of sexual activity by your partner, ex-partner or family member?: Not on file   Housing Stability: Not on file       Current Outpatient Medications:   •  amLODIPine (NORVASC) 10 mg tablet, Take 10 mg by mouth daily, Disp: , Rfl:   •  AMLODIPINE BESYLATE PO, Take 10 mg by mouth daily , Disp: , Rfl:   •  aspirin 81 mg chewable tablet, Chew 1 tablet (81 mg total) daily, Disp: 30 tablet, Rfl: 0  •  atorvastatin (LIPITOR) 40 mg tablet, Take 1 tablet (40 mg total) by mouth daily with dinner, Disp: 30 tablet, Rfl: 0  •  escitalopram (LEXAPRO) 10 mg tablet, Take 10 mg by mouth daily, Disp: , Rfl:   •  leflunomide (ARAVA) 10 MG tablet, Take 10 mg by mouth daily, Disp: , Rfl:   •  loperamide (IMODIUM) 2 mg capsule, Take 2 mg by mouth as needed for diarrhea, Disp: , Rfl:   •  loratadine (CLARITIN) 10 mg tablet, Take 1 tablet by mouth daily, Disp: 20 tablet, Rfl: 0  •  LORazepam (ATIVAN) 0.5 mg tablet, Take 0.5 mg by mouth, Disp: , Rfl:   •  metFORMIN (GLUCOPHAGE) 500 mg tablet, Take 500 mg by mouth 2 (two) times a day with meals, Disp: , Rfl:   •  omega-3-acid ethyl esters (LOVAZA) 1 g capsule, Take 2 g by mouth 2 (two) times a day, Disp: , Rfl: 3  •  pantoprazole (PROTONIX) 40 mg tablet, Take 1 tablet (40 mg total) by mouth daily, Disp: 31 tablet, Rfl: 6  •  PROAIR  (90 Base) MCG/ACT inhaler, Inhale 2 puffs every 4 (four) hours as needed for wheezing, Disp: , Rfl: 3  •  propranolol (INDERAL LA) 60 mg 24 hr capsule, Take 60 mg by mouth daily, Disp: , Rfl:   •  propranolol (INDERAL) 10 mg tablet, Take 10 mg by mouth 2 (two) times a day, Disp: , Rfl:   •  simvastatin (ZOCOR) 10 mg tablet, Take 1 tablet by mouth every evening, Disp: , Rfl:   •  gabapentin (NEURONTIN) 100 mg capsule, TAKE 1  CAPSULE BY MOUTH THREE TIMES A DAY (Patient not taking: No sig reported), Disp: 90 capsule, Rfl: 5  •  tamsulosin (FLOMAX) 0.4 mg, Take 1 capsule (0.4 mg total) by mouth daily with dinner for 7 days (Patient not taking: Reported on 3/27/2024), Disp: 7 capsule, Rfl: 0  Allergies   Allergen Reactions   • Gabapentin Other (See Comments)     Depression   • Latex Itching       Physical Exam:     Vitals:    08/05/24 1349   BP: 104/80   Pulse: 75   SpO2: 96%     Physical Exam  Constitutional:       Appearance: Normal appearance.   HENT:      Head: Normocephalic and atraumatic.      Nose: Nose normal.      Mouth/Throat:      Mouth: Mucous membranes are moist.   Eyes:      Pupils: Pupils are equal, round, and reactive to light.   Neck:      Comments: Neck examination no palpable mass masses trachea is midline.  No discrete palpable thyroid nodule no JVD neck is supple no palpable adenopathy  Cardiovascular:      Rate and Rhythm: Normal rate.      Pulses: Normal pulses.      Heart sounds: Normal heart sounds.   Pulmonary:      Effort: Pulmonary effort is normal.      Breath sounds: Normal breath sounds.   Abdominal:      General: Bowel sounds are normal.      Palpations: Abdomen is soft.   Musculoskeletal:         General: Normal range of motion.      Cervical back: Normal range of motion and neck supple.   Skin:     General: Skin is warm.             Comments: Posterior neck surgical scar from DFSP resection.  No regional adenopathy   Neurological:      General: No focal deficit present.      Mental Status: She is alert and oriented to person, place, and time.   Psychiatric:         Mood and Affect: Mood normal.         Behavior: Behavior normal.         Thought Content: Thought content normal.         Judgment: Judgment normal.       Results & Discussion:     arrative & Impression   THYROID ULTRASOUND     INDICATION: E04.1: Nontoxic single thyroid nodule.     COMPARISON: None     TECHNIQUE: Ultrasound of the thyroid was  performed with a high frequency linear transducer in transverse and sagittal planes including volumetric imaging sweeps as well as traditional still imaging technique.     FINDINGS:  Thyroid texture: Normal homogeneous smooth echotexture.     Right lobe: 4.5 x 1.5 x 1.6 cm. Volume 4.9 mL  Left lobe: 3.2 x 1.1 x 0.8 cm. Volume 1.4 mL  Isthmus: 0.2 cm.     Nodule #1. Image 24.  RIGHT upper pole nodule measuring 1.3 x 0.8 x 0.9 cm.  COMPOSITION: 2 points, solid or almost completely solid.  ECHOGENICITY: 2 points, hypoechoic.  SHAPE: 0 points, wider-than-tall.  MARGIN: 0 points, smooth.  ECHOGENIC FOCI: 0 points, none or large comet-tail artifacts.  TI-RADS Classification: TR 4 (4-6 points), FNA if > 1.5 cm. Follow if > 1 cm.     IMPRESSION:     No nodule meets current ACR criteria for requiring biopsy but follow-up ultrasound is recommended in 1 year.        I did review ultrasound report with nontoxic single right thyroid nodule clinically euthyroid.  She also has a history of dermatofibrosarcoma resection.  Thyroid nodule repeat ultrasound in 1 year was recommended.  We will see her after her next I did discussed in detail nature of thyroid ultrasound.  she understands and  agrees . All patient questions were answered.       Advance Care Planning/Advance Directives:  I Owen Petersen MD discussed the disease status with Lidia Patricio  today 08/05/24  treatment plans and follow-up with the patient.

## 2025-07-10 ENCOUNTER — TELEPHONE (OUTPATIENT)
Dept: GASTROENTEROLOGY | Facility: CLINIC | Age: 65
End: 2025-07-10

## 2025-08-05 ENCOUNTER — TELEPHONE (OUTPATIENT)
Dept: SURGICAL ONCOLOGY | Facility: CLINIC | Age: 65
End: 2025-08-05

## 2025-08-06 ENCOUNTER — TELEPHONE (OUTPATIENT)
Dept: SURGICAL ONCOLOGY | Facility: CLINIC | Age: 65
End: 2025-08-06